# Patient Record
Sex: FEMALE | Race: WHITE | NOT HISPANIC OR LATINO | Employment: FULL TIME | URBAN - METROPOLITAN AREA
[De-identification: names, ages, dates, MRNs, and addresses within clinical notes are randomized per-mention and may not be internally consistent; named-entity substitution may affect disease eponyms.]

---

## 2021-05-17 ENCOUNTER — HISTORICAL (OUTPATIENT)
Dept: ADMINISTRATIVE | Facility: HOSPITAL | Age: 41
End: 2021-05-17

## 2021-06-14 LAB — FINAL CULTURE: NORMAL

## 2022-01-20 ENCOUNTER — HISTORICAL (OUTPATIENT)
Dept: ADMINISTRATIVE | Facility: HOSPITAL | Age: 42
End: 2022-01-20

## 2022-01-23 LAB — FINAL CULTURE: NO GROWTH

## 2022-09-01 ENCOUNTER — TELEPHONE (OUTPATIENT)
Dept: OTOLARYNGOLOGY | Facility: CLINIC | Age: 42
End: 2022-09-01
Payer: COMMERCIAL

## 2022-09-01 NOTE — TELEPHONE ENCOUNTER
Received referral, scheduled appt at the [facility] for 9/12 with Ramon. Then scanned referral into chart.

## 2022-09-13 DIAGNOSIS — E04.1 THYROID CYST: Primary | ICD-10-CM

## 2022-09-15 ENCOUNTER — TELEPHONE (OUTPATIENT)
Dept: OTOLARYNGOLOGY | Facility: CLINIC | Age: 42
End: 2022-09-15
Payer: COMMERCIAL

## 2022-09-15 NOTE — TELEPHONE ENCOUNTER
Dr. Medeiros called wanting to speak to Dr. Navarro about patient's case. Said he is an endocrinologist working with her and her thyroid, said she has a bit of an unusual cse. Told him Dr. Navarro would be back later this afternoon. He said Dr. Navarro could call him back any time after 2 PM at [phone].

## 2022-09-16 ENCOUNTER — HOSPITAL ENCOUNTER (OUTPATIENT)
Dept: RADIOLOGY | Facility: HOSPITAL | Age: 42
Discharge: HOME | End: 2022-09-16
Attending: INTERNAL MEDICINE
Payer: COMMERCIAL

## 2022-09-16 DIAGNOSIS — E04.1 THYROID CYST: ICD-10-CM

## 2022-09-16 PROCEDURE — 70492 CT SFT TSUE NCK W/O & W/DYE: CPT | Mod: TC

## 2022-09-19 ENCOUNTER — OFFICE VISIT (OUTPATIENT)
Dept: OTOLARYNGOLOGY | Facility: CLINIC | Age: 42
End: 2022-09-19
Payer: COMMERCIAL

## 2022-09-19 ENCOUNTER — LAB VISIT (OUTPATIENT)
Dept: LAB | Facility: HOSPITAL | Age: 42
End: 2022-09-19
Attending: OTOLARYNGOLOGY
Payer: COMMERCIAL

## 2022-09-19 VITALS — DIASTOLIC BLOOD PRESSURE: 77 MMHG | HEART RATE: 74 BPM | WEIGHT: 130 LBS | SYSTOLIC BLOOD PRESSURE: 119 MMHG

## 2022-09-19 DIAGNOSIS — Z01.818 PREOP TESTING: ICD-10-CM

## 2022-09-19 DIAGNOSIS — C73 PAPILLARY THYROID CARCINOMA: ICD-10-CM

## 2022-09-19 DIAGNOSIS — Z01.818 PREOP TESTING: Primary | ICD-10-CM

## 2022-09-19 LAB
ANION GAP SERPL CALC-SCNC: 12 MMOL/L (ref 8–16)
BASOPHILS # BLD AUTO: 0.02 K/UL (ref 0–0.2)
BASOPHILS NFR BLD: 0.3 % (ref 0–1.9)
BUN SERPL-MCNC: 10 MG/DL (ref 6–20)
CALCIUM SERPL-MCNC: 9.8 MG/DL (ref 8.7–10.5)
CALCIUM SERPL-MCNC: 9.8 MG/DL (ref 8.7–10.5)
CHLORIDE SERPL-SCNC: 102 MMOL/L (ref 95–110)
CO2 SERPL-SCNC: 21 MMOL/L (ref 23–29)
CREAT SERPL-MCNC: 0.8 MG/DL (ref 0.5–1.4)
DIFFERENTIAL METHOD: ABNORMAL
EOSINOPHIL # BLD AUTO: 0.1 K/UL (ref 0–0.5)
EOSINOPHIL NFR BLD: 1.1 % (ref 0–8)
ERYTHROCYTE [DISTWIDTH] IN BLOOD BY AUTOMATED COUNT: 12.3 % (ref 11.5–14.5)
EST. GFR  (NO RACE VARIABLE): >60 ML/MIN/1.73 M^2
GLUCOSE SERPL-MCNC: 84 MG/DL (ref 70–110)
HCT VFR BLD AUTO: 40 % (ref 37–48.5)
HGB BLD-MCNC: 13.2 G/DL (ref 12–16)
IMM GRANULOCYTES # BLD AUTO: 0.01 K/UL (ref 0–0.04)
IMM GRANULOCYTES NFR BLD AUTO: 0.2 % (ref 0–0.5)
LYMPHOCYTES # BLD AUTO: 1.7 K/UL (ref 1–4.8)
LYMPHOCYTES NFR BLD: 26.8 % (ref 18–48)
MCH RBC QN AUTO: 32 PG (ref 27–31)
MCHC RBC AUTO-ENTMCNC: 33 G/DL (ref 32–36)
MCV RBC AUTO: 97 FL (ref 82–98)
MONOCYTES # BLD AUTO: 0.4 K/UL (ref 0.3–1)
MONOCYTES NFR BLD: 5.9 % (ref 4–15)
NEUTROPHILS # BLD AUTO: 4.1 K/UL (ref 1.8–7.7)
NEUTROPHILS NFR BLD: 65.7 % (ref 38–73)
NRBC BLD-RTO: 0 /100 WBC
PLATELET # BLD AUTO: 205 K/UL (ref 150–450)
PMV BLD AUTO: 11.8 FL (ref 9.2–12.9)
POTASSIUM SERPL-SCNC: 3.9 MMOL/L (ref 3.5–5.1)
PTH-INTACT SERPL-MCNC: 57.1 PG/ML (ref 9–77)
RBC # BLD AUTO: 4.13 M/UL (ref 4–5.4)
SODIUM SERPL-SCNC: 135 MMOL/L (ref 136–145)
WBC # BLD AUTO: 6.24 K/UL (ref 3.9–12.7)

## 2022-09-19 PROCEDURE — 3078F PR MOST RECENT DIASTOLIC BLOOD PRESSURE < 80 MM HG: ICD-10-PCS | Mod: CPTII,S$GLB,, | Performed by: OTOLARYNGOLOGY

## 2022-09-19 PROCEDURE — 1159F PR MEDICATION LIST DOCUMENTED IN MEDICAL RECORD: ICD-10-PCS | Mod: CPTII,S$GLB,, | Performed by: OTOLARYNGOLOGY

## 2022-09-19 PROCEDURE — 1159F MED LIST DOCD IN RCRD: CPT | Mod: CPTII,S$GLB,, | Performed by: OTOLARYNGOLOGY

## 2022-09-19 PROCEDURE — 85025 COMPLETE CBC W/AUTO DIFF WBC: CPT | Performed by: OTOLARYNGOLOGY

## 2022-09-19 PROCEDURE — 80048 BASIC METABOLIC PNL TOTAL CA: CPT | Performed by: OTOLARYNGOLOGY

## 2022-09-19 PROCEDURE — 36415 COLL VENOUS BLD VENIPUNCTURE: CPT | Performed by: OTOLARYNGOLOGY

## 2022-09-19 PROCEDURE — 3078F DIAST BP <80 MM HG: CPT | Mod: CPTII,S$GLB,, | Performed by: OTOLARYNGOLOGY

## 2022-09-19 PROCEDURE — 99999 PR PBB SHADOW E&M-EST. PATIENT-LVL V: CPT | Mod: PBBFAC,,, | Performed by: OTOLARYNGOLOGY

## 2022-09-19 PROCEDURE — 99999 PR PBB SHADOW E&M-EST. PATIENT-LVL V: ICD-10-PCS | Mod: PBBFAC,,, | Performed by: OTOLARYNGOLOGY

## 2022-09-19 PROCEDURE — 99204 OFFICE O/P NEW MOD 45 MIN: CPT | Mod: S$GLB,,, | Performed by: OTOLARYNGOLOGY

## 2022-09-19 PROCEDURE — 3074F PR MOST RECENT SYSTOLIC BLOOD PRESSURE < 130 MM HG: ICD-10-PCS | Mod: CPTII,S$GLB,, | Performed by: OTOLARYNGOLOGY

## 2022-09-19 PROCEDURE — 83970 ASSAY OF PARATHORMONE: CPT | Performed by: OTOLARYNGOLOGY

## 2022-09-19 PROCEDURE — 3074F SYST BP LT 130 MM HG: CPT | Mod: CPTII,S$GLB,, | Performed by: OTOLARYNGOLOGY

## 2022-09-19 PROCEDURE — 99204 PR OFFICE/OUTPT VISIT, NEW, LEVL IV, 45-59 MIN: ICD-10-PCS | Mod: S$GLB,,, | Performed by: OTOLARYNGOLOGY

## 2022-09-19 RX ORDER — CLINDAMYCIN PHOSPHATE 11.9 MG/ML
SOLUTION TOPICAL
COMMUNITY

## 2022-09-19 RX ORDER — BENZOYL PEROXIDE 100 MG/ML
LIQUID TOPICAL
COMMUNITY

## 2022-09-19 RX ORDER — THYROID, PORCINE 15 MG/1
15 TABLET ORAL DAILY
Status: ON HOLD | COMMUNITY
Start: 2022-09-07 | End: 2022-10-05 | Stop reason: HOSPADM

## 2022-09-19 RX ORDER — THYROID 30 MG/1
TABLET ORAL
Status: ON HOLD | COMMUNITY
End: 2022-10-05 | Stop reason: HOSPADM

## 2022-09-19 NOTE — H&P (VIEW-ONLY)
Referring Provider:    Paco Medeiros Md  [address].  [address]  [place] [address]  Subjective:   Patient: Barbi Munoz [id number], :1980   Visit date:2022 9:24 AM    Chief Complaint:  Follow-up    HPI:  Barbi is a 42 y.o. female who I was asked to see in consultation for evaluation of the following issue(s):    I called and discussed her case with Dr. Medeiros (endocrinology).  Briefly, she had a left sided thyroid nodule and an abnormal left lateral neck lymph node on ultrasound. She had FNA performed of the thyroid nodule which returned as nondiagnostic.  This was repeated and returned as [place] 3 then was send for Afirma which returned as low risk on genetic profiling.  She also had FNA performed on the lymph node.  This was read as significantly irregular but not fully diagnostic of papillary thyroid cancer.  Tg washout was then performed on the specimen and this returned as >8000, highly consistent with tissue of thyroid origin in a lateral lymph node.  She has since had a CT of the neck performed.         COMPRESSIVE SYMPTOMS OR MINOR RISK FACTORS FOR THYROID CANCER (Negative unless checked):  []  Increasing in size over the past 6 months  [x]  Dysphagia   []  Dyspnea on exertion  []  Orthopnea  []  Hemoptysis  []  Voice changes  [x]  Pain- left sided lower neck  []  AGE >45    [x]  FEMALE     HIGH RISK HISTORY FOR THYROID CANCER:  []  Thyroid cancer in 1 or more first degree relatives  []  History of radiation exposure to the neck  []  Prior thyroidectomy with dx of thyroid cancer  []  PET positive nodule  []  Multiple Endocrine Neoplasia  []  Familial Medullary Thyroid Cancer    (2009 REVISED [employer] MANAGEMENT GUIDELINES FOR PATIENTS WITH THYROID NODULES AND DIFFERENTIATED THYROID CANCER)            Objective:   Physical Exam:  Vitals:  /77   Pulse 74   Wt 59 kg (130 lb)   LMP 2022 (Exact Date)   General appearance:  Well developed,  well nourished        Communication:  no hoarseness, no dysphonia    Ears:  Otoscopy of external auditory canals and tympanic membranes was normal, clinical speech reception thresholds grossly intact, no mass/lesion of auricle.  Nose:  No masses/lesions of external nose, nasal mucosa, septum, and turbinates were within normal limits.  Mouth:  No mass/lesion of lips, teeth, gums, hard/soft palate, tongue, tonsils, or oropharynx.      Neck & Lymphatics:  palpable level 4 cervical lymphadenopathy tender to palpation on the left, no neck mass/crepitus/ asymmetry, trachea is midline.  THYROID: palpable nodule on the left    DATA REVIEWED    [x] On thyroid hormone medications (check if YES)  On thyroid armour      ULTRASOUND:  No results found for this or any previous visit.    No results found for this or any previous visit.        PATHOLOGY:  See H&P    Independent interpretation of thyroid ultrasound images:  CT neck with contrast- left sided thyroid nodule with numerous calcifications; left sided level 4 lymph node with peripheral calcification; moderately enlarged additional lymph nodes in left level 4-6      Assessment & Plan:   Barbi was seen today for follow-up.    Diagnoses and all orders for this visit:    Preop testing  -     CBC W/ AUTO DIFFERENTIAL; Future  -     BASIC METABOLIC PANEL; Future    Papillary thyroid carcinoma  -     Case Request Operating Room: DISSECTION, NECK  -     PTH, Intact; Future  -     Calcium; Future        Despite the low risk Afirma testing, Tg in a lateral lymph node is extremely consistent with the diagnosis of papillary thyroid cancer with regional lymph node metastases.      My recommendation is total thyroidectomy with central neck dissection (level 6) and ipsilateral (left) modified radical neck dissection levels 2-5.    I explained the risks of thyroidectomy include, but are not limited to, infection, bleeding, scarring, failure to achieve the diagnosis, no evidence of  cancer, recurrence, collection of blood or tissue fluid requiring drainage, injury to the recurrent laryngeal nerve with resultant temporary or permanent hoarseness (1% permanent risk with up to 10% temporary risk, greater in revision operations), injury to the superior laryngeal nerve with resultant loss of the upper register for singing or challenges with yelling, temporary or permanent hypocalcemia related to injury or devascularization of the parathyroid glands (less than 5% permanent, up to 30-60% when paratracheal dissection is accomplished, again greater in revision operations), and the need for additional procedures or therapies. Time was allowed for questions, and all questions were answered to the patient's apparent satisfaction. The risks of paratracheal lymph node dissection are included above.  We discussed that her likelihood of transient or permanent hypoparathyroidism/hypocalcemia is significantly elevated when paratracheal lymph node dissection is performed.    The risks, benefits and alternatives were discussed at length with Barbi and they had the opportunity to ask several questions to their satisfaction.  The risks included but, were not limited to, scarring, bleeding, infection, fistula formation, chyle leak, weakness of the lip/shoulder/tongue and cosmetic deformity.      She understands that there is a significant likelihood of needing postoperative radioactive iodine therapy postoperatively.       Barbi understands the situation and after a lengthy discussion would like to proceed.  Surgery is scheduled for October 3, 2022.     Thank you for allowing me to participate in the care of Barbi.    Francisco Navarro MD    Total time spent in the care of this patient    New  3  [] 30-44 min  4  [] 45-59 min  5  [x] 60-74 min    Established  3  [] 20-29 min  4  [] 30-39 min  5  [] 40-54 min      [x] preparing to see the patient (eg, review of tests)  [x] obtaining and/or reviewing separately  obtained history  [x] performing a medically appropriate examination and/or evaluation  [x] counseling and educating the patient/family/caregiver  [x] ordering medications, tests, or procedures  [] referring and communicating with other health care professionals (when not separately reported)  [x] documenting clinical information in the electronic or other health record  [x] independently interpreting results (not separately reported) and communicating results to the  patient/ family/caregiver  [] care coordination (not separately reported)

## 2022-09-19 NOTE — PROGRESS NOTES
Referring Provider:    Paco Medeiros Md  [address].  [address]  [place] [address]  Subjective:   Patient: Barbi Munoz [id number], :1980   Visit date:2022 9:24 AM    Chief Complaint:  Follow-up    HPI:  Barbi is a 42 y.o. female who I was asked to see in consultation for evaluation of the following issue(s):    I called and discussed her case with Dr. Medeiros (endocrinology).  Briefly, she had a left sided thyroid nodule and an abnormal left lateral neck lymph node on ultrasound. She had FNA performed of the thyroid nodule which returned as nondiagnostic.  This was repeated and returned as [place] 3 then was send for Afirma which returned as low risk on genetic profiling.  She also had FNA performed on the lymph node.  This was read as significantly irregular but not fully diagnostic of papillary thyroid cancer.  Tg washout was then performed on the specimen and this returned as >8000, highly consistent with tissue of thyroid origin in a lateral lymph node.  She has since had a CT of the neck performed.         COMPRESSIVE SYMPTOMS OR MINOR RISK FACTORS FOR THYROID CANCER (Negative unless checked):  []  Increasing in size over the past 6 months  [x]  Dysphagia   []  Dyspnea on exertion  []  Orthopnea  []  Hemoptysis  []  Voice changes  [x]  Pain- left sided lower neck  []  AGE >45    [x]  FEMALE     HIGH RISK HISTORY FOR THYROID CANCER:  []  Thyroid cancer in 1 or more first degree relatives  []  History of radiation exposure to the neck  []  Prior thyroidectomy with dx of thyroid cancer  []  PET positive nodule  []  Multiple Endocrine Neoplasia  []  Familial Medullary Thyroid Cancer    (2009 REVISED [employer]IATION MANAGEMENT GUIDELINES FOR PATIENTS WITH THYROID NODULES AND DIFFERENTIATED THYROID CANCER)            Objective:   Physical Exam:  Vitals:  /77   Pulse 74   Wt 59 kg (130 lb)   LMP 2022 (Exact Date)   General appearance:  Well developed,  well nourished        Communication:  no hoarseness, no dysphonia    Ears:  Otoscopy of external auditory canals and tympanic membranes was normal, clinical speech reception thresholds grossly intact, no mass/lesion of auricle.  Nose:  No masses/lesions of external nose, nasal mucosa, septum, and turbinates were within normal limits.  Mouth:  No mass/lesion of lips, teeth, gums, hard/soft palate, tongue, tonsils, or oropharynx.      Neck & Lymphatics:  palpable level 4 cervical lymphadenopathy tender to palpation on the left, no neck mass/crepitus/ asymmetry, trachea is midline.  THYROID: palpable nodule on the left    DATA REVIEWED    [x] On thyroid hormone medications (check if YES)  On thyroid armour      ULTRASOUND:  No results found for this or any previous visit.    No results found for this or any previous visit.        PATHOLOGY:  See H&P    Independent interpretation of thyroid ultrasound images:  CT neck with contrast- left sided thyroid nodule with numerous calcifications; left sided level 4 lymph node with peripheral calcification; moderately enlarged additional lymph nodes in left level 4-6      Assessment & Plan:   Barbi was seen today for follow-up.    Diagnoses and all orders for this visit:    Preop testing  -     CBC W/ AUTO DIFFERENTIAL; Future  -     BASIC METABOLIC PANEL; Future    Papillary thyroid carcinoma  -     Case Request Operating Room: DISSECTION, NECK  -     PTH, Intact; Future  -     Calcium; Future        Despite the low risk Afirma testing, Tg in a lateral lymph node is extremely consistent with the diagnosis of papillary thyroid cancer with regional lymph node metastases.      My recommendation is total thyroidectomy with central neck dissection (level 6) and ipsilateral (left) modified radical neck dissection levels 2-5.    I explained the risks of thyroidectomy include, but are not limited to, infection, bleeding, scarring, failure to achieve the diagnosis, no evidence of  cancer, recurrence, collection of blood or tissue fluid requiring drainage, injury to the recurrent laryngeal nerve with resultant temporary or permanent hoarseness (1% permanent risk with up to 10% temporary risk, greater in revision operations), injury to the superior laryngeal nerve with resultant loss of the upper register for singing or challenges with yelling, temporary or permanent hypocalcemia related to injury or devascularization of the parathyroid glands (less than 5% permanent, up to 30-60% when paratracheal dissection is accomplished, again greater in revision operations), and the need for additional procedures or therapies. Time was allowed for questions, and all questions were answered to the patient's apparent satisfaction. The risks of paratracheal lymph node dissection are included above.  We discussed that her likelihood of transient or permanent hypoparathyroidism/hypocalcemia is significantly elevated when paratracheal lymph node dissection is performed.    The risks, benefits and alternatives were discussed at length with Barbi and they had the opportunity to ask several questions to their satisfaction.  The risks included but, were not limited to, scarring, bleeding, infection, fistula formation, chyle leak, weakness of the lip/shoulder/tongue and cosmetic deformity.      She understands that there is a significant likelihood of needing postoperative radioactive iodine therapy postoperatively.       Barbi understands the situation and after a lengthy discussion would like to proceed.  Surgery is scheduled for October 3, 2022.     Thank you for allowing me to participate in the care of Babri.    Francisco Navarro MD    Total time spent in the care of this patient    New  3  [] 30-44 min  4  [] 45-59 min  5  [x] 60-74 min    Established  3  [] 20-29 min  4  [] 30-39 min  5  [] 40-54 min      [x] preparing to see the patient (eg, review of tests)  [x] obtaining and/or reviewing separately  obtained history  [x] performing a medically appropriate examination and/or evaluation  [x] counseling and educating the patient/family/caregiver  [x] ordering medications, tests, or procedures  [] referring and communicating with other health care professionals (when not separately reported)  [x] documenting clinical information in the electronic or other health record  [x] independently interpreting results (not separately reported) and communicating results to the  patient/ family/caregiver  [] care coordination (not separately reported)

## 2022-09-19 NOTE — PATIENT INSTRUCTIONS
Keep up to date on our practice locations and hours!    Let us know how we are doing!              [email or web address]

## 2022-09-28 ENCOUNTER — HOSPITAL ENCOUNTER (OUTPATIENT)
Dept: PREADMISSION TESTING | Facility: HOSPITAL | Age: 42
Discharge: HOME | End: 2022-09-28
Attending: OTOLARYNGOLOGY | Admitting: OTOLARYNGOLOGY
Payer: COMMERCIAL

## 2022-09-28 ENCOUNTER — PATIENT MESSAGE (OUTPATIENT)
Dept: SURGERY | Facility: HOSPITAL | Age: 42
End: 2022-09-28
Payer: COMMERCIAL

## 2022-09-28 VITALS
TEMPERATURE: 98 F | RESPIRATION RATE: 18 BRPM | DIASTOLIC BLOOD PRESSURE: 76 MMHG | SYSTOLIC BLOOD PRESSURE: 135 MMHG | HEART RATE: 77 BPM | OXYGEN SATURATION: 100 %

## 2022-09-28 DIAGNOSIS — C73 PAPILLARY THYROID CARCINOMA: ICD-10-CM

## 2022-09-28 NOTE — DISCHARGE INSTRUCTIONS
To confirm, your doctor has instructed you that surgery is scheduled for 10/3/2022.       Pre admit office will call the afternoon prior to surgery between 1PM and 3PM with arrival time.    Surgery will be at [facility],  The address is [address]. [place]  [address].      IMPORTANT INSTRUCTIONS!    Do not eat or drink after 12 midnight, including water.   Do not smoke or use chewing tobacco after 12 midnight  OK to brush teeth, but no gum, candy, or mints!      Take only these medicines with a small swallow of water-morning of surgery.     [place] Thyroid      ____ Stop Aspirin, Ibuprofen, Motrin and Aleve at least 5-7 days before surgery, unless otherwise instructed by your doctor, or the nurse.   You MAY use Tylenol/acetaminophen until day of surgery.      ____  If you take diabetic medication, do NOT take morning of surgery unless instructed by Doctor. Metformin must be stopped 24 hrs prior to surgery time.       ____ Stop taking any Fish Oil supplements or Vitamins at least 5 days prior to surgery, unless instructed otherwise by your Doctor.       Bathing Instructions: The night before surgery and the morning prior to coming to the hospital:    - Shower & rinse your body as usual with anti-bacterial Soap (Dial or Lever 2000)   -Hibiclens (if indicated) use AFTER anti-bacterial soap; 1 packet PM/1 packet in AM on surgical site only   -Do not use hibiclens on your head, face, or genitals.    -Do not wash with anti-bacterial soap after you use the hibiclens.    -Do not shave surgical site 5-7 days prior to surgery.    -Pubic hair 7 days prior to surgery (gyn pt's).      Pediatric patients do not need to use anti-bacterial soap or Hibiclens.             After Bathing:   __ No powder, lotions, creams, or body spray to skin     __No deodorant for any breast procedure, PORT, or upper arm surgery     __ No makeup, mascara, nail polish or artificial nails        **SURGERY WILL BE CANCELLED IF  ARTIFICIAL/NAIL POLISH IS PRESENT!!!**    __ Please remove all piercings and leave all jewelry at home.    **SURGERY WILL BE CANCELLED IF PIERCINGS ARE PRESENT!!!**      __ Dentures, Hearing Aids and Contact Lens need to be removed prior to the start of surgery.      __ Wear clean, loose-fitting clothing. Allow for dressings/bandages/surgical equipment     __ You must have transportation, and they MUST stay the entire time.       [facility] Visitor/Ride Policy:   Only 1 adult allowed (over the age of 18) to accompany you into Pre-op/Recovery Surgery Dept and must stay through the entire length of admission.     Must have a ride home from a responsible adult that you know and trust.      Pediatric Patients are allowed 2 adult visitors.     Medical Transport, Uber or Lyft can only be used if patient has a responsible adult to accompany them during ride home.         Post-Op Instructions: You will receive surgery post-op instructions by your Discharge Nurse prior to going home.     Surgical Site Infection:   Prevention of surgical site infections:   -Keep incisions clean and dry.   -Do not soak/submerge incisions in water until completely healed.   -Do not apply lotions, powders, creams, or deodorants to site.   -Always make sure hands are cleaned with antibacterial soap/ alcohol-based   prior to touching the surgical site.       Signs and symptoms:               -Redness and pain around the area where you had surgery               -Drainage of cloudy fluid from your surgical wound               -Fever over 100.4 or chills     >>>Call Surgeon office/on-call Surgeon if you experience any of these signs & symptoms post-surgery.        *Please Call [facility] Pre-Admissions Department with surgery instruction questions at [phone].     *Insurance Questions, please call [phone] or [phone]

## 2022-09-28 NOTE — H&P
Preoperative History and Physical  [facility]                                                                   Chief Complaint: Preoperative evaluation     History of Present Illness:      Barbi Munoz is a 42 y.o. female with a PMHx of Hashimoto's disease, Interstitial Cystitis, Celiac Disease, and suspected Papillary thyroid carcinoma who presents to the office today for a preoperative consultation at the request of Dr. Navarro who plans on performing Left neck dissection and total thyroidectomy on October 3.     Functional Status:      The patient is able to climb a flight of stairs. The patient is able to ambulate without difficulty. The patient's functional status is not affected by the surgical problem. The patient's functional status is not affected by shortness of breath, chest pain, dyspnea on exertion and fatigue.      MET score greater than 4    Past Medical History:      Past Medical History:   Diagnosis Date    Cancer     Papillary thyroid carcinoma    Celiac disease     Hashimoto's disease     Interstitial cystitis         Past Surgical History:      Past Surgical History:   Procedure Laterality Date    ADENOIDECTOMY       SECTION      x 3    CYSTOSCOPY      TONSILLECTOMY      .    Social History:      Social History     Socioeconomic History    Marital status:    Tobacco Use    Smoking status: Former     Types: Cigarettes     Quit date:      Years since quittin.7     Passive exposure: Never    Smokeless tobacco: Never   Substance and Sexual Activity    Alcohol use: Never    Drug use: Never        Family History:      Family History   Problem Relation Age of Onset    Hypertension Mother     Hypertension Father     Hashimoto's thyroiditis Sister     Arthritis Sister     Autism Brother     Epilepsy Brother     No Known Problems Maternal Grandfather     No Known Problems Maternal Grandmother     No Known Problems Paternal  Grandfather     No Known Problems Paternal Grandmother     Ovarian cancer Paternal Aunt        Allergies:      Review of patient's allergies indicates:   Allergen Reactions    Gluten     Latex Rash    Sulfa (sulfonamide antibiotics) Rash       Medications:      Current Outpatient Medications   Medication Sig    ARMOUR THYROID 15 mg Tab Take 15 mg by mouth once daily.    benzoyl peroxide (BENZAC AC) 10 % external wash Acne Control Cleanser 10 % topical cleanser   APPLY TO SKIN FOR 5 MINUTES THEN RINSE DAILY    clindamycin (CLEOCIN T) 1 % external solution clindamycin phosphate 1 % topical solution    thyroid, pork, (ARMOUR THYROID) 30 mg Tab [place] Thyroid 30 mg tablet   TAKE 1 TABLET BY MOUTH EVERY DAY WITH 15MG     No current facility-administered medications for this encounter.       Vitals:      Vitals:    09/28/22 1055   BP: 135/76   Pulse: 77   Resp: 18   Temp: 98.4 °F (36.9 °C)       Review of Systems:        Constitutional: Negative for fever, chills, weight loss, malaise/fatigue and diaphoresis.   HENT: Negative for hearing loss, ear pain, nosebleeds, congestion, sore throat, neck pain, tinnitus and ear discharge.  Positive for Left sided neck pain, left sided ear pain, and discomfort during swallowing, but no dysphagia.  Eyes: Negative for blurred vision, double vision, photophobia, pain, discharge and redness.   Respiratory: Negative for cough, hemoptysis, sputum production, shortness of breath, wheezing and stridor.    Cardiovascular: Negative for chest pain, palpitations, orthopnea, claudication, leg swelling and PND.   Gastrointestinal: Negative for heartburn, nausea, vomiting, abdominal pain, diarrhea, constipation, blood in stool and melena.   Genitourinary: Negative for dysuria, urgency, frequency, hematuria and flank pain.   Musculoskeletal: Negative for myalgias, back pain, joint pain and falls.   Skin: Negative for itching and rash.   Neurological: Negative for dizziness, tingling, tremors,  sensory change, speech change, focal weakness, seizures, loss of consciousness, weakness and headaches.   Endo/Heme/Allergies: Negative for environmental allergies and polydipsia. Does not bruise/bleed easily.   Psychiatric/Behavioral: Negative for depression, suicidal ideas, hallucinations, memory loss and substance abuse. The patient is not nervous/anxious and does not have insomnia.    All 14 systems reviewed and negative except as noted above.    Physical Exam:      Constitutional: Appears well-developed, well-nourished and in no acute distress.  Patient is oriented to person, place, and time.   Head: Normocephalic and atraumatic. Mucous membranes moist.  Neck: Neck supple no mass.   Cardiovascular: Normal rate and regular rhythm.  S1 S2 appreciated by ascultation.  Pulmonary/Chest: Effort normal and clear to auscultation bilaterally. No respiratory distress.   Abdomen: Soft. Non-tender and non-distended. Bowel sounds are normal.   Neurological: Patient is alert and oriented to person, place and time. Moves all extremities.  Skin: Warm and dry. No lesions.  Extremities: No clubbing, cyanosis or edema.    Laboratory data:      Reviewed and noted in plan where applicable. Please see chart for full laboratory data.    No results for input(s): CPK, CPKMB, TROPONINI, MB in the last 24 hours. No results for input(s): POCTGLUCOSE in the last 24 hours.     No results found for: INR, PROTIME    Lab Results   Component Value Date    WBC 6.24 09/19/2022    HGB 13.2 09/19/2022    HCT 40.0 09/19/2022    MCV 97 09/19/2022     09/19/2022       No results for input(s): GLU, NA, K, CL, CO2, BUN, CREATININE, CALCIUM, MG in the last 24 hours.    Predictors of intubation difficulty:       Morbid obesity? no   Anatomically abnormal facies? no   Prominent incisors? no   Receding mandible? no   Short, thick neck? no   Neck range of motion: normal   Dentition: No chipped, loose, or missing teeth.  Based on the Modified  Mallampati, patient is a mallampati score: I (soft palate, uvula, fauces, and tonsillar pillars visible)    Cardiographics:      ECG:  Not indicated.    Echocardiogram:  Not indicated.    Imaging:      Chest x-ray:  Not indicated.      Assessment and Plan:      Papillary thyroid carcinoma  - Patient presented today at the request of Dr. Navarro who plans performing a central neck dissection (level 6) and ipsilateral (left) modified radical neck dissection levels 2-5, and total thyroidectomy on 10/3.      Known risk factors for perioperative complications: None    Difficulty with intubation is not anticipated.    Cardiac Risk Estimation: Based on the Revised Cardiac Risk index, patient is a Class 1 risk with a 3.9% risk of a major cardiac event in a low risk procedure.    1.) Preoperative workup as follows: hemoglobin, hematocrit, electrolytes, creatinine, glucose, liver function studies.  2.) Change in medication regimen before surgery: discontinue ASA 6 days before surgery, discontinue NSAIDs 5 days before surgery.  3.) Prophylaxis for cardiac events with perioperative beta-blockers: not indicated.  4.) Invasive hemodynamic monitoring perioperatively: not indicated.  5.) Deep vein thrombosis prophylaxis postoperatively: intermittent pneumatic compression boots and regimen to be chosen by surgical team.  6.) Surveillance for postoperative MI with ECG immediately postoperatively and on postoperati ve days 1 and 2 AND troponin levels 24 hours postoperatively and on day 4 or hospital discharge (whichever comes first): not indicated.  7.) Current medications which may produce withdrawal symptoms if withheld perioperatively: None.  8.) Other measures: None.        Electronically signed by Camila Freire DNP, ACNP on 9/28/2022 at 11:06 AM.

## 2022-09-28 NOTE — ASSESSMENT & PLAN NOTE
- Patient presented today at the request of Dr. Navarro who plans performing a central neck dissection (level 6) and ipsilateral (left) modified radical neck dissection levels 2-5, and total thyroidectomy on 10/3.      Known risk factors for perioperative complications: None    Difficulty with intubation is not anticipated.    Cardiac Risk Estimation: Based on the Revised Cardiac Risk index, patient is a Class 1 risk with a 3.9% risk of a major cardiac event in a low risk procedure.    1.) Preoperative workup as follows: hemoglobin, hematocrit, electrolytes, creatinine, glucose, liver function studies.  2.) Change in medication regimen before surgery: discontinue ASA 6 days before surgery, discontinue NSAIDs 5 days before surgery.  3.) Prophylaxis for cardiac events with perioperative beta-blockers: not indicated.  4.) Invasive hemodynamic monitoring perioperatively: not indicated.  5.) Deep vein thrombosis prophylaxis postoperatively: intermittent pneumatic compression boots and regimen to be chosen by surgical team.  6.) Surveillance for postoperative MI with ECG immediately postoperatively and on postoperati ve days 1 and 2 AND troponin levels 24 hours postoperatively and on day 4 or hospital discharge (whichever comes first): not indicated.  7.) Current medications which may produce withdrawal symptoms if withheld perioperatively: None.  8.) Other measures: None.

## 2022-09-29 ENCOUNTER — ANESTHESIA EVENT (OUTPATIENT)
Dept: SURGERY | Facility: HOSPITAL | Age: 42
DRG: 626 | End: 2022-09-29
Payer: COMMERCIAL

## 2022-10-03 ENCOUNTER — ANESTHESIA (OUTPATIENT)
Dept: SURGERY | Facility: HOSPITAL | Age: 42
DRG: 626 | End: 2022-10-03
Payer: COMMERCIAL

## 2022-10-03 ENCOUNTER — HOSPITAL ENCOUNTER (INPATIENT)
Facility: HOSPITAL | Age: 42
LOS: 2 days | Discharge: HOME | DRG: 626 | End: 2022-10-05
Attending: OTOLARYNGOLOGY | Admitting: OTOLARYNGOLOGY
Payer: COMMERCIAL

## 2022-10-03 DIAGNOSIS — C73 PAPILLARY THYROID CARCINOMA: Primary | ICD-10-CM

## 2022-10-03 LAB
B-HCG UR QL: NEGATIVE
CALCIUM SERPL-MCNC: 8.7 MG/DL (ref 8.7–10.5)
CTP QC/QA: YES
MAGNESIUM SERPL-MCNC: 2 MG/DL (ref 1.6–2.6)
PTH-INTACT SERPL-MCNC: 6.3 PG/ML (ref 9–77)

## 2022-10-03 PROCEDURE — 25000003 PHARM REV CODE 250: Performed by: NURSE ANESTHETIST, CERTIFIED REGISTERED

## 2022-10-03 PROCEDURE — D9220A PRA ANESTHESIA: Mod: CRNA,,, | Performed by: NURSE ANESTHETIST, CERTIFIED REGISTERED

## 2022-10-03 PROCEDURE — 63600175 PHARM REV CODE 636 W HCPCS: Performed by: OTOLARYNGOLOGY

## 2022-10-03 PROCEDURE — 38724 PR REMOVAL NODES, NECK,CERV MOD RAD: ICD-10-PCS | Mod: LT,,, | Performed by: OTOLARYNGOLOGY

## 2022-10-03 PROCEDURE — 83970 ASSAY OF PARATHORMONE: CPT | Performed by: OTOLARYNGOLOGY

## 2022-10-03 PROCEDURE — 60252 REMOVAL OF THYROID: CPT | Mod: 51,,, | Performed by: OTOLARYNGOLOGY

## 2022-10-03 PROCEDURE — 88305 TISSUE EXAM BY PATHOLOGIST: CPT | Performed by: STUDENT IN AN ORGANIZED HEALTH CARE EDUCATION/TRAINING PROGRAM

## 2022-10-03 PROCEDURE — 88305 TISSUE EXAM BY PATHOLOGIST: CPT | Mod: 26,,, | Performed by: STUDENT IN AN ORGANIZED HEALTH CARE EDUCATION/TRAINING PROGRAM

## 2022-10-03 PROCEDURE — 11000001 HC ACUTE MED/SURG PRIVATE ROOM

## 2022-10-03 PROCEDURE — 71000033 HC RECOVERY, INTIAL HOUR: Performed by: OTOLARYNGOLOGY

## 2022-10-03 PROCEDURE — D9220A PRA ANESTHESIA: Mod: ANES,,, | Performed by: ANESTHESIOLOGY

## 2022-10-03 PROCEDURE — 38724 REMOVAL OF LYMPH NODES NECK: CPT | Mod: LT,,, | Performed by: OTOLARYNGOLOGY

## 2022-10-03 PROCEDURE — 63600175 PHARM REV CODE 636 W HCPCS: Performed by: NURSE ANESTHETIST, CERTIFIED REGISTERED

## 2022-10-03 PROCEDURE — 36000707: Performed by: OTOLARYNGOLOGY

## 2022-10-03 PROCEDURE — 88305 TISSUE EXAM BY PATHOLOGIST: ICD-10-PCS | Mod: 26,,, | Performed by: STUDENT IN AN ORGANIZED HEALTH CARE EDUCATION/TRAINING PROGRAM

## 2022-10-03 PROCEDURE — 71000039 HC RECOVERY, EACH ADD'L HOUR: Performed by: OTOLARYNGOLOGY

## 2022-10-03 PROCEDURE — C1729 CATH, DRAINAGE: HCPCS | Performed by: OTOLARYNGOLOGY

## 2022-10-03 PROCEDURE — 88307 PR  SURG PATH,LEVEL V: ICD-10-PCS | Mod: 26,,, | Performed by: STUDENT IN AN ORGANIZED HEALTH CARE EDUCATION/TRAINING PROGRAM

## 2022-10-03 PROCEDURE — 27201423 OPTIME MED/SURG SUP & DEVICES STERILE SUPPLY: Performed by: OTOLARYNGOLOGY

## 2022-10-03 PROCEDURE — D9220A PRA ANESTHESIA: ICD-10-PCS | Mod: CRNA,,, | Performed by: NURSE ANESTHETIST, CERTIFIED REGISTERED

## 2022-10-03 PROCEDURE — 88307 TISSUE EXAM BY PATHOLOGIST: CPT | Performed by: STUDENT IN AN ORGANIZED HEALTH CARE EDUCATION/TRAINING PROGRAM

## 2022-10-03 PROCEDURE — 37000008 HC ANESTHESIA 1ST 15 MINUTES: Performed by: OTOLARYNGOLOGY

## 2022-10-03 PROCEDURE — 60252 PR THYROIDECTOMY,MALIG,LTD NECK SURG: ICD-10-PCS | Mod: 51,,, | Performed by: OTOLARYNGOLOGY

## 2022-10-03 PROCEDURE — 36000706: Performed by: OTOLARYNGOLOGY

## 2022-10-03 PROCEDURE — 37000009 HC ANESTHESIA EA ADD 15 MINS: Performed by: OTOLARYNGOLOGY

## 2022-10-03 PROCEDURE — 81025 URINE PREGNANCY TEST: CPT | Performed by: OTOLARYNGOLOGY

## 2022-10-03 PROCEDURE — 25000003 PHARM REV CODE 250: Performed by: OTOLARYNGOLOGY

## 2022-10-03 PROCEDURE — 82310 ASSAY OF CALCIUM: CPT | Performed by: OTOLARYNGOLOGY

## 2022-10-03 PROCEDURE — 88307 TISSUE EXAM BY PATHOLOGIST: CPT | Mod: 26,,, | Performed by: STUDENT IN AN ORGANIZED HEALTH CARE EDUCATION/TRAINING PROGRAM

## 2022-10-03 PROCEDURE — 83735 ASSAY OF MAGNESIUM: CPT | Performed by: OTOLARYNGOLOGY

## 2022-10-03 PROCEDURE — D9220A PRA ANESTHESIA: ICD-10-PCS | Mod: ANES,,, | Performed by: ANESTHESIOLOGY

## 2022-10-03 PROCEDURE — 63600175 PHARM REV CODE 636 W HCPCS: Performed by: ANESTHESIOLOGY

## 2022-10-03 RX ORDER — MIDAZOLAM HYDROCHLORIDE 1 MG/ML
INJECTION, SOLUTION INTRAMUSCULAR; INTRAVENOUS
Status: DISCONTINUED | OUTPATIENT
Start: 2022-10-03 | End: 2022-10-03

## 2022-10-03 RX ORDER — SUCCINYLCHOLINE CHLORIDE 20 MG/ML
INJECTION INTRAMUSCULAR; INTRAVENOUS
Status: DISCONTINUED | OUTPATIENT
Start: 2022-10-03 | End: 2022-10-03

## 2022-10-03 RX ORDER — SODIUM CHLORIDE, SODIUM LACTATE, POTASSIUM CHLORIDE, CALCIUM CHLORIDE 600; 310; 30; 20 MG/100ML; MG/100ML; MG/100ML; MG/100ML
INJECTION, SOLUTION INTRAVENOUS CONTINUOUS
Status: ACTIVE | OUTPATIENT
Start: 2022-10-03 | End: 2022-10-03

## 2022-10-03 RX ORDER — LEVOTHYROXINE SODIUM 25 UG/1
150 TABLET ORAL
Status: DISCONTINUED | OUTPATIENT
Start: 2022-10-04 | End: 2022-10-05 | Stop reason: HOSPADM

## 2022-10-03 RX ORDER — SODIUM CHLORIDE 0.9 % (FLUSH) 0.9 %
10 SYRINGE (ML) INJECTION
Status: DISCONTINUED | OUTPATIENT
Start: 2022-10-03 | End: 2022-10-03

## 2022-10-03 RX ORDER — ACETAMINOPHEN 325 MG/1
650 TABLET ORAL EVERY 4 HOURS PRN
Status: DISCONTINUED | OUTPATIENT
Start: 2022-10-03 | End: 2022-10-05 | Stop reason: HOSPADM

## 2022-10-03 RX ORDER — ONDANSETRON 2 MG/ML
8 INJECTION INTRAMUSCULAR; INTRAVENOUS EVERY 8 HOURS PRN
Status: DISCONTINUED | OUTPATIENT
Start: 2022-10-03 | End: 2022-10-05 | Stop reason: HOSPADM

## 2022-10-03 RX ORDER — PROPOFOL 10 MG/ML
VIAL (ML) INTRAVENOUS CONTINUOUS PRN
Status: DISCONTINUED | OUTPATIENT
Start: 2022-10-03 | End: 2022-10-03

## 2022-10-03 RX ORDER — DEXAMETHASONE SODIUM PHOSPHATE 4 MG/ML
INJECTION, SOLUTION INTRA-ARTICULAR; INTRALESIONAL; INTRAMUSCULAR; INTRAVENOUS; SOFT TISSUE
Status: DISCONTINUED | OUTPATIENT
Start: 2022-10-03 | End: 2022-10-03

## 2022-10-03 RX ORDER — HYDROMORPHONE HYDROCHLORIDE 2 MG/ML
0.2 INJECTION, SOLUTION INTRAMUSCULAR; INTRAVENOUS; SUBCUTANEOUS EVERY 5 MIN PRN
Status: DISCONTINUED | OUTPATIENT
Start: 2022-10-03 | End: 2022-10-03

## 2022-10-03 RX ORDER — LANOLIN ALCOHOL/MO/W.PET/CERES
400 CREAM (GRAM) TOPICAL 2 TIMES DAILY
Status: DISCONTINUED | OUTPATIENT
Start: 2022-10-03 | End: 2022-10-05 | Stop reason: HOSPADM

## 2022-10-03 RX ORDER — LIDOCAINE HCL/PF 100 MG/5ML
SYRINGE (ML) INTRAVENOUS
Status: DISCONTINUED | OUTPATIENT
Start: 2022-10-03 | End: 2022-10-03

## 2022-10-03 RX ORDER — FENTANYL CITRATE 50 UG/ML
INJECTION, SOLUTION INTRAMUSCULAR; INTRAVENOUS
Status: DISCONTINUED | OUTPATIENT
Start: 2022-10-03 | End: 2022-10-03

## 2022-10-03 RX ORDER — BUPIVACAINE HYDROCHLORIDE AND EPINEPHRINE 5; 5 MG/ML; UG/ML
INJECTION, SOLUTION EPIDURAL; INTRACAUDAL; PERINEURAL
Status: DISPENSED
Start: 2022-10-03 | End: 2022-10-03

## 2022-10-03 RX ORDER — ONDANSETRON 2 MG/ML
INJECTION INTRAMUSCULAR; INTRAVENOUS
Status: DISCONTINUED | OUTPATIENT
Start: 2022-10-03 | End: 2022-10-03

## 2022-10-03 RX ORDER — MUPIROCIN 20 MG/G
OINTMENT TOPICAL
Status: DISCONTINUED
Start: 2022-10-03 | End: 2022-10-03 | Stop reason: WASHOUT

## 2022-10-03 RX ORDER — BUPIVACAINE HYDROCHLORIDE AND EPINEPHRINE 5; 5 MG/ML; UG/ML
INJECTION, SOLUTION EPIDURAL; INTRACAUDAL; PERINEURAL
Status: DISCONTINUED | OUTPATIENT
Start: 2022-10-03 | End: 2022-10-03 | Stop reason: HOSPADM

## 2022-10-03 RX ORDER — SODIUM CHLORIDE, SODIUM LACTATE, POTASSIUM CHLORIDE, CALCIUM CHLORIDE 600; 310; 30; 20 MG/100ML; MG/100ML; MG/100ML; MG/100ML
INJECTION, SOLUTION INTRAVENOUS CONTINUOUS
Status: DISCONTINUED | OUTPATIENT
Start: 2022-10-03 | End: 2022-10-03

## 2022-10-03 RX ORDER — ROCURONIUM BROMIDE 10 MG/ML
INJECTION, SOLUTION INTRAVENOUS
Status: DISCONTINUED | OUTPATIENT
Start: 2022-10-03 | End: 2022-10-03

## 2022-10-03 RX ORDER — ACETAMINOPHEN 10 MG/ML
INJECTION, SOLUTION INTRAVENOUS
Status: DISCONTINUED | OUTPATIENT
Start: 2022-10-03 | End: 2022-10-03

## 2022-10-03 RX ORDER — PROPOFOL 10 MG/ML
INJECTION, EMULSION INTRAVENOUS
Status: DISCONTINUED | OUTPATIENT
Start: 2022-10-03 | End: 2022-10-03

## 2022-10-03 RX ORDER — METOCLOPRAMIDE HYDROCHLORIDE 5 MG/ML
10 INJECTION INTRAMUSCULAR; INTRAVENOUS EVERY 10 MIN PRN
Status: DISCONTINUED | OUTPATIENT
Start: 2022-10-03 | End: 2022-10-03

## 2022-10-03 RX ORDER — MORPHINE SULFATE 10 MG/ML
2 INJECTION INTRAMUSCULAR; INTRAVENOUS; SUBCUTANEOUS
Status: DISCONTINUED | OUTPATIENT
Start: 2022-10-03 | End: 2022-10-05 | Stop reason: HOSPADM

## 2022-10-03 RX ORDER — OXYCODONE HYDROCHLORIDE 5 MG/1
10 TABLET ORAL EVERY 4 HOURS PRN
Status: DISCONTINUED | OUTPATIENT
Start: 2022-10-03 | End: 2022-10-05 | Stop reason: HOSPADM

## 2022-10-03 RX ORDER — CALCITRIOL 0.25 UG/1
0.25 CAPSULE ORAL 2 TIMES DAILY
Status: DISCONTINUED | OUTPATIENT
Start: 2022-10-03 | End: 2022-10-05 | Stop reason: HOSPADM

## 2022-10-03 RX ORDER — CALCIUM CARBONATE 200(500)MG
2000 TABLET,CHEWABLE ORAL
Status: DISCONTINUED | OUTPATIENT
Start: 2022-10-03 | End: 2022-10-04

## 2022-10-03 RX ORDER — HYDROCODONE BITARTRATE AND ACETAMINOPHEN 5; 325 MG/1; MG/1
1 TABLET ORAL EVERY 4 HOURS PRN
Status: DISCONTINUED | OUTPATIENT
Start: 2022-10-03 | End: 2022-10-05 | Stop reason: HOSPADM

## 2022-10-03 RX ADMIN — FENTANYL CITRATE 50 MCG: 50 INJECTION, SOLUTION INTRAMUSCULAR; INTRAVENOUS at 11:10

## 2022-10-03 RX ADMIN — PROPOFOL 100 MG: 10 INJECTION, EMULSION INTRAVENOUS at 11:10

## 2022-10-03 RX ADMIN — MIDAZOLAM 2 MG: 1 INJECTION INTRAMUSCULAR; INTRAVENOUS at 11:10

## 2022-10-03 RX ADMIN — FENTANYL CITRATE 25 MCG: 50 INJECTION, SOLUTION INTRAMUSCULAR; INTRAVENOUS at 01:10

## 2022-10-03 RX ADMIN — CALCIUM CARBONATE 2000 MG: 500 TABLET, CHEWABLE ORAL at 03:10

## 2022-10-03 RX ADMIN — ONDANSETRON 8 MG: 2 INJECTION INTRAMUSCULAR; INTRAVENOUS at 09:10

## 2022-10-03 RX ADMIN — SODIUM CHLORIDE, SODIUM LACTATE, POTASSIUM CHLORIDE, AND CALCIUM CHLORIDE: .6; .31; .03; .02 INJECTION, SOLUTION INTRAVENOUS at 05:10

## 2022-10-03 RX ADMIN — PROPOFOL 20 MG: 10 INJECTION, EMULSION INTRAVENOUS at 11:10

## 2022-10-03 RX ADMIN — DEXTROSE 2 G: 50 INJECTION, SOLUTION INTRAVENOUS at 11:10

## 2022-10-03 RX ADMIN — HYDROMORPHONE HYDROCHLORIDE 0.2 MG: 2 INJECTION INTRAMUSCULAR; INTRAVENOUS; SUBCUTANEOUS at 03:10

## 2022-10-03 RX ADMIN — ONDANSETRON 4 MG: 2 INJECTION, SOLUTION INTRAMUSCULAR; INTRAVENOUS at 01:10

## 2022-10-03 RX ADMIN — DEXAMETHASONE SODIUM PHOSPHATE 8 MG: 4 INJECTION, SOLUTION INTRA-ARTICULAR; INTRALESIONAL; INTRAMUSCULAR; INTRAVENOUS; SOFT TISSUE at 12:10

## 2022-10-03 RX ADMIN — PROPOFOL 100 MCG/KG/MIN: 10 INJECTION, EMULSION INTRAVENOUS at 11:10

## 2022-10-03 RX ADMIN — ACETAMINOPHEN 1000 MG: 10 INJECTION, SOLUTION INTRAVENOUS at 12:10

## 2022-10-03 RX ADMIN — MORPHINE SULFATE 2 MG: 10 INJECTION INTRAVENOUS at 06:10

## 2022-10-03 RX ADMIN — CALCITRIOL 0.25 MCG: 0.25 CAPSULE, LIQUID FILLED ORAL at 03:10

## 2022-10-03 RX ADMIN — ROCURONIUM BROMIDE 10 MG: 10 INJECTION, SOLUTION INTRAVENOUS at 11:10

## 2022-10-03 RX ADMIN — Medication 400 MG: at 03:10

## 2022-10-03 RX ADMIN — ACETAMINOPHEN 650 MG: 325 TABLET ORAL at 11:10

## 2022-10-03 RX ADMIN — REMIFENTANIL HYDROCHLORIDE 0.1 MCG/KG/MIN: 1 INJECTION, POWDER, LYOPHILIZED, FOR SOLUTION INTRAVENOUS at 11:10

## 2022-10-03 RX ADMIN — OXYCODONE HYDROCHLORIDE 10 MG: 5 TABLET ORAL at 08:10

## 2022-10-03 RX ADMIN — PROPOFOL 80 MG: 10 INJECTION, EMULSION INTRAVENOUS at 11:10

## 2022-10-03 RX ADMIN — OXYCODONE HYDROCHLORIDE 10 MG: 5 TABLET ORAL at 03:10

## 2022-10-03 RX ADMIN — SUCCINYLCHOLINE CHLORIDE 200 MG: 20 INJECTION, SOLUTION INTRAMUSCULAR; INTRAVENOUS at 11:10

## 2022-10-03 RX ADMIN — Medication 80 MG: at 11:10

## 2022-10-03 RX ADMIN — FENTANYL CITRATE 25 MCG: 50 INJECTION, SOLUTION INTRAMUSCULAR; INTRAVENOUS at 03:10

## 2022-10-03 RX ADMIN — SODIUM CHLORIDE, SODIUM LACTATE, POTASSIUM CHLORIDE, AND CALCIUM CHLORIDE: .6; .31; .03; .02 INJECTION, SOLUTION INTRAVENOUS at 10:10

## 2022-10-03 NOTE — TRANSFER OF CARE
"Anesthesia Transfer of Care Note    Patient: Barbi Munoz    Procedure(s) Performed: Procedure(s) (LRB):  DISSECTION, NECK (Left)  THYROIDECTOMY (Bilateral)    Patient location: PACU    Anesthesia Type: general    Transport from OR: Transported from OR on room air with adequate spontaneous ventilation    Post pain: adequate analgesia    Post assessment: no apparent anesthetic complications and tolerated procedure well    Post vital signs: stable    Level of consciousness: awake and alert    Nausea/Vomiting: no nausea/vomiting    Complications: none    Transfer of care protocol was followed      Last vitals:   Visit Vitals  /82 (BP Location: Right arm, Patient Position: Sitting)   Pulse 68   Temp 36.7 °C (98 °F) (Temporal)   Resp 18   Ht 5' 4" (1.626 m)   Wt 58.7 kg (129 lb 6.6 oz)   LMP 09/05/2022 (Exact Date)   SpO2 99%   Breastfeeding No   BMI 22.21 kg/m²     "

## 2022-10-03 NOTE — ANESTHESIA POSTPROCEDURE EVALUATION
Anesthesia Post Evaluation    Patient: Barbi Munoz    Procedure(s) Performed: Procedure(s) (LRB):  DISSECTION, NECK (Left)  THYROIDECTOMY (Bilateral)    Final Anesthesia Type: general      Patient location during evaluation: PACU  Patient participation: Yes- Able to Participate  Level of consciousness: awake and alert  Post-procedure vital signs: reviewed and stable  Pain management: adequate  Airway patency: patent    PONV status at discharge: No PONV  Anesthetic complications: no      Cardiovascular status: blood pressure returned to baseline  Respiratory status: unassisted  Hydration status: euvolemic  Follow-up not needed.          Vitals Value Taken Time   /66 10/03/22 1618   Temp 36.7 °C (98.1 °F) 10/03/22 1505   Pulse 58 10/03/22 1621   Resp 9 10/03/22 1621   SpO2 98 % 10/03/22 1621   Vitals shown include unvalidated device data.      No case tracking events are documented in the log.      Pain/Anshul Score: Pain Rating Prior to Med Admin: 7 (10/3/2022  3:58 PM)  Anshul Score: 10 (10/3/2022  4:05 PM)

## 2022-10-03 NOTE — ANESTHESIA PREPROCEDURE EVALUATION
10/03/2022  Barbi Munoz is a 42 y.o., female.  Pre-operative evaluation for Procedure(s) (LRB):  DISSECTION, NECK (Left)    Barbi Munoz is a 42 y.o. female     Patient Active Problem List   Diagnosis    Papillary thyroid carcinoma       Review of patient's allergies indicates:   Allergen Reactions    Gluten     Nickel     Latex Rash    Sulfa (sulfonamide antibiotics) Rash       No current facility-administered medications on file prior to encounter.     Current Outpatient Medications on File Prior to Encounter   Medication Sig Dispense Refill    ARMOUR THYROID 15 mg Tab Take 15 mg by mouth once daily.      thyroid, pork, (ARMOUR THYROID) 30 mg Tab [place] Thyroid 30 mg tablet   TAKE 1 TABLET BY MOUTH EVERY DAY WITH 15MG      benzoyl peroxide (BENZAC AC) 10 % external wash Acne Control Cleanser 10 % topical cleanser   APPLY TO SKIN FOR 5 MINUTES THEN RINSE DAILY      clindamycin (CLEOCIN T) 1 % external solution clindamycin phosphate 1 % topical solution         Past Surgical History:   Procedure Laterality Date    ADENOIDECTOMY       SECTION      x 3    CYSTOSCOPY      TONSILLECTOMY         Social History     Socioeconomic History    Marital status:    Tobacco Use    Smoking status: Former     Types: Cigarettes     Quit date:      Years since quittin.7     Passive exposure: Never    Smokeless tobacco: Never   Substance and Sexual Activity    Alcohol use: Not Currently     Comment: rarely    Drug use: Never         CBC: No results for input(s): WBC, RBC, HGB, HCT, PLT, MCV, MCH, MCHC in the last 72 hours.    CMP: No results for input(s): NA, K, CL, CO2, BUN, CREATININE, GLU, MG, PHOS, CALCIUM, ALBUMIN, PROT, ALKPHOS, ALT, AST, BILITOT in the last 72 hours.    INR  No results for input(s): PT, INR, PROTIME, APTT in the last 72  hours.        Diagnostic Studies:      EKD Echo:  No results found for this or any previous visit.      Pre-op Assessment    I have reviewed the Patient Summary Reports.     I have reviewed the Nursing Notes. I have reviewed the NPO Status.   I have reviewed the Medications.     Review of Systems  Anesthesia Hx:  No problems with previous Anesthesia  History of prior surgery of interest to airway management or planning: Denies Family Hx of Anesthesia complications.   Denies Personal Hx of Anesthesia complications.   Social:  Former Smoker    Hematology/Oncology:  Hematology Normal      Current/Recent Cancer. Oncology Comments: Papillary thyroid carcinoma    EENT/Dental:EENT/Dental Normal   Cardiovascular:   Denies Dysrhythmias.   Denies Angina.  Denies CHF.    Pulmonary:  Pulmonary Normal    Hepatic/GI:   Celiac disease   Musculoskeletal:  Musculoskeletal Normal    Neurological:  Neurology Normal    Endocrine:   Hashimoto's disease   Psych:  Psychiatric Normal           Physical Exam  General: Well nourished    Airway:  Mallampati: II   Mouth Opening: Normal  TM Distance: Normal  Tongue: Normal  Neck ROM: Normal ROM    Dental:  Intact    Chest/Lungs:  Normal Respiratory Rate    Heart:  Rate: Normal  Rhythm: Regular Rhythm        Anesthesia Plan  Type of Anesthesia, risks & benefits discussed:    Anesthesia Type: Gen ETT  Intra-op Monitoring Plan: Standard ASA Monitors  Post Op Pain Control Plan: multimodal analgesia and IV/PO Opioids PRN  Induction:  IV  Airway Plan: Direct, Post-Induction  Informed Consent: Informed consent signed with the Patient and all parties understand the risks and agree with anesthesia plan.  All questions answered.   ASA Score: 2  Day of Surgery Review of History & Physical: H&P Update referred to the surgeon/provider.    Ready For Surgery From Anesthesia Perspective.     .

## 2022-10-03 NOTE — PLAN OF CARE
Reviewed and completed all PACU orders. I encouraged questions, answered them thoroughly, and evaluated my instructions via teach-back method. I have disconnected patient from monitoring equipment and prepared them for the next phase of care. Patient has met all PACU discharge criteria at this point.

## 2022-10-03 NOTE — CARE UPDATE
Received pt. from PACU nurse WOLFGANG Garcia. Pt. alert and oriented x4. Resp. even and unlabored. No acute distress noted. Sx incision noted to anterior portion of neck with KALEIGH drain noted to site. Scant amount of sanguinous fluid noted in compressed bulb. Safety measures intact and ongoing . Family members at bedside.Will continue to monitor per POC.

## 2022-10-03 NOTE — OP NOTE
Operative Note       Surgery Date: 10/3/2022     Surgeon: Jas Reed MD    Assistant:  Treasure PARDOA    Implants- None    Pre-op Diagnosis:  Papillary thyroid carcinoma    Post-op Diagnosis: same    Anesthesia: GETA    Technical Procedures Used:     Total thyroidectomy with central neck dissection (24404)  Left modified radical neck dissection (387)    Findings:  The bilateral recurrent laryngeal nerve(s) was identified and preserved.   The NIMS system was used at 0.5 mA with confirmed stimulation of the nerve(s) immediately prior to closure.  The superior parathyroid glands were identified and preserved.  There were several enlarged and firm lymph nodes in left levels 3,4,5.  There were moderately enlarged and firm lymph nodes in the left paratracheal space.  There were no grossly irregular lymph nodes in the right paratracheal space.  There was a very firm nodule in the left thyroid lobe.  There was no significant substernal extension of the thyroid or gross invasion of the strap muscles.         Estimated Blood Loss:  30 mL                     Specimens:   Specimens (From admission, onward)       Start     Ordered    10/03/22 1356  Specimen to Pathology, Surgery ENT  Once        Comments: Pre-op Diagnosis: Papillary thyroid carcinoma [C73]Procedure(s):DISSECTION, NECKTHYROIDECTOMY Number of specimens: 4Name of specimens:   1. Left level 4 & 5 lymph nodes   2. Left level 3 lymph nodes   3. Left level 2 lymph nodes   4. Total thyroid stitch right upper pole  5. Paratracheal lymph nodes     References:    Click here for ordering Quick Tip   Question Answer Comment   Procedure Type: ENT    Specimen Class: Known or suspected malignancy    Which provider would you like to cc? JAS REED    Release to patient Immediate        10/03/22 5524                           Indications: she had a left sided thyroid nodule and an abnormal left lateral neck lymph node on ultrasound. She had FNA performed of the thyroid  nodule which returned as nondiagnostic.  This was repeated and returned as [place] 3 then was send for Afirma which returned as low risk on genetic profiling.  She also had FNA performed on the lymph node.  This was read as significantly irregular but not fully diagnostic of papillary thyroid cancer.  Tg washout was then performed on the specimen and this returned as >8000, highly consistent with tissue of thyroid origin in a lateral lymph node.  She has had a CT of the neck performed which demonstrated a calcified, enlarged lymph node in left level 4.    Procedure Details   The patient was seen in the Holding Room. The risks, benefits, complications, treatment options, and expected outcomes were discussed with the patient. The possibilities of reaction to medication, pulmonary aspiration, perforation of viscus, bleeding, recurrent infection, finding a normal thyroid, recurrently laryngeal nerve damage, shoulder weakness, the need for additional procedures, failure to diagnose a condition, and creating a complication requiring transfusion or operation were discussed with the patient. The patient concurred with the proposed plan, giving informed consent.  The site of surgery properly noted/marked. The patient was taken to Operating Room, identified as Barbi Munoz and the procedure verified as Thyroidectomy with central neck dissection and left modified radical neck dissection. A Time Out was held and the above information confirmed.    The patient was placed supine after induction of a general anesthetic.  The neck was extended and prepped and draped in standard fashion.  A 7 cm transverse cervical incision was created above the sternal notch within a natural skin fold extending to the left just over the medial border of the sternocleidomastoid muscle.The incision was carried down through subcutaneous tissues and platysmal muscle with the use of electrocautery.  A subplatysmal flap was elevated  anteriorly to the midline as well as to the superior border of the clavicle.  A superior subplatysmal flap was also elevated to the level of the digastric muscles and posteriorly to the medial tip of the mastoid. The skin flaps were retracted in position with fish hooks.      Dissection initially began with identification of the anterior belly of the digastric.  The digastric was then dissected along posteriorly to the sternocleidomastoid,  the surrounding soft tissues from the submandibular gland leaving the submandibular gland intact.  Then the fascia was incised along the sternocleidomastoid and this was dissected anteriorly until the spinal accessory nerve was identified.  The spinal accessory nerve was then dissected superiorly until passing below the digastric.  The nerve was eventually traced going up toward the jugular foramen. The plane of dissection was then continued from lateral to medial with retraction of the sternocleidomastoid until the deep cervical rootlets were identified and preserved.  The floor of the neck was then identified and the dissection was carried medially until the internal jugular vein was identified.  The dissection then continued in the plane of the floor of then neck working from superior to inferior until the omohyoid was identified.   The omohyoid fascia was then incised and the muscle itself circumferentially dissected, allowing it to be retracted, allowing access to level IV of the neck.  The transverse cervical artery and vein were identified, defining the inferior extent of the dissection.  I then continued to dissect beneath the sternocleidomastoid muscle until was able to access level 5 of the neck.  Working then medially, the inferior aspect of the specimen.   As the dissection continued medially, the internal jugular vein was again encountered.  During the dissection, care was taken not to injure the vagus nerve.  The dissection continued medially over the  internal jugular vein, freed from the carotid artery and continued medially to the strap muscles, which were preserved.  The specimen was then removed.  Working from inferior to superior, the specimen was segmented in order to delineate the lymph node stations and placed into a separate specimen containers.  This was then sent to pathology.    The strap muscles were identified and divided at the midline.  Sharp dissection was used to mobilize the left thyroid lobe in a medial direction.  Dissection continued posteriorly to expose the tracheoesophageal groove and carotid artery.  The recurrent laryngeal nerve was identified and preserved.  The thyroid lobe was mobilized further and the superior and inferior pole vessels were divided with the harmonic scalpel.  The middle thyroid vein was divided with the harmonic scalpel.  Small vessels were likewise divided.  The gland was rotated in a medial direction and taken off the trachea using the bipolar.  The isthmus was removed off the thyroid cartilage using the bipolar.   Sharp dissection was used to mobilize the contralateral thyroid lobe in a medial direction.  Dissection continued posteriorly to expose the tracheoesophageal groove and carotid artery.  The recurrent laryngeal nerve was identified and preserved.  The thyroid lobe was mobilized further and the superior and inferior pole vessels were divided with the harmonic scalpel.  The middle thyroid vein was divided with the harmonic scalpel.  Small vessels were likewise divided.  The gland was rotated in a medial direction and taken off the trachea using the bipolar.  The gland was then inspected on the back table for any unintentional containment of parathyroid tissue which was not noted to be present on my review.  A suture was placed for orientation purposes. The specimen was submitted to pathology for permanent pathology.    I then proceeded with a central neck dissection.  I began on the left side by dissecting  along the course the insertion port of the recurrent laryngeal nerve and dissected down to the thoracic inlet.  Then working between the trachea and the carotid artery, of the intervening soft tissue was removed using the Harmonic.  Of note, there was a small but very firm lymph node immediately posterior to the recurrent laryngeal nerve.  There were multiple other moderately enlarged lymph nodes in the area.  The pretracheal lymph nodes were then removed with the Harmonic.  I then proceeded to dissect along the right recurrent laryngeal nerve also from its entry point into the thoracic inlet and the fibrofatty tissue was then removed around the nerve.  Of note this area had a paucity of lymph nodes and fibrofatty tissue.      The wound was irrigated and inspected carefully.  The parathyroid tissue was found to be viable and the recurrent laryngeal nerve was left intact in its anatomic locations.  A 15 Fr Earl-Gardner drain was placed within the depths of the wound, exiting the lateral aspect of the wound and secured with 3-0 Prolene.The strap muscles were closed with 3-0 Vicryl suture.  The platysma was closed with 3-0 Vicryl interrupted  sutures.  The skin incision was closed with a running 4-0 Stratafix subcuticular closure.  Dermabond Prineo was used to seal the incision.    Instrument, sponge, and needle counts were correct prior to closure and at the conclusion of the case.              Complications:  None; patient tolerated the procedure well.           Disposition: PACU - hemodynamically stable.           Condition: stable.    Attending Attestation: I performed the procedure.

## 2022-10-04 LAB
CALCIUM SERPL-MCNC: 8 MG/DL (ref 8.7–10.5)
CALCIUM SERPL-MCNC: 8.1 MG/DL (ref 8.7–10.5)
MAGNESIUM SERPL-MCNC: 1.8 MG/DL (ref 1.6–2.6)

## 2022-10-04 PROCEDURE — 63600175 PHARM REV CODE 636 W HCPCS: Performed by: OTOLARYNGOLOGY

## 2022-10-04 PROCEDURE — 11000001 HC ACUTE MED/SURG PRIVATE ROOM

## 2022-10-04 PROCEDURE — 83735 ASSAY OF MAGNESIUM: CPT | Performed by: OTOLARYNGOLOGY

## 2022-10-04 PROCEDURE — 25000003 PHARM REV CODE 250: Performed by: OTOLARYNGOLOGY

## 2022-10-04 PROCEDURE — 82310 ASSAY OF CALCIUM: CPT | Performed by: OTOLARYNGOLOGY

## 2022-10-04 RX ORDER — SODIUM CHLORIDE, SODIUM LACTATE, POTASSIUM CHLORIDE, CALCIUM CHLORIDE 600; 310; 30; 20 MG/100ML; MG/100ML; MG/100ML; MG/100ML
INJECTION, SOLUTION INTRAVENOUS CONTINUOUS
Status: DISCONTINUED | OUTPATIENT
Start: 2022-10-04 | End: 2022-10-05 | Stop reason: HOSPADM

## 2022-10-04 RX ORDER — CELECOXIB 100 MG/1
200 CAPSULE ORAL 2 TIMES DAILY
Status: DISCONTINUED | OUTPATIENT
Start: 2022-10-04 | End: 2022-10-05 | Stop reason: HOSPADM

## 2022-10-04 RX ORDER — CALCIUM CARBONATE 200(500)MG
2000 TABLET,CHEWABLE ORAL EVERY 6 HOURS SCHEDULED
Status: DISCONTINUED | OUTPATIENT
Start: 2022-10-04 | End: 2022-10-05 | Stop reason: HOSPADM

## 2022-10-04 RX ADMIN — ONDANSETRON 8 MG: 2 INJECTION INTRAMUSCULAR; INTRAVENOUS at 02:10

## 2022-10-04 RX ADMIN — SODIUM CHLORIDE, SODIUM LACTATE, POTASSIUM CHLORIDE, AND CALCIUM CHLORIDE: .6; .31; .03; .02 INJECTION, SOLUTION INTRAVENOUS at 06:10

## 2022-10-04 RX ADMIN — CALCIUM CARBONATE 2000 MG: 500 TABLET, CHEWABLE ORAL at 08:10

## 2022-10-04 RX ADMIN — HYDROCODONE BITARTRATE AND ACETAMINOPHEN 1 TABLET: 5; 325 TABLET ORAL at 06:10

## 2022-10-04 RX ADMIN — CALCITRIOL 0.25 MCG: 0.25 CAPSULE, LIQUID FILLED ORAL at 09:10

## 2022-10-04 RX ADMIN — Medication 400 MG: at 08:10

## 2022-10-04 RX ADMIN — Medication 400 MG: at 09:10

## 2022-10-04 RX ADMIN — CELECOXIB 200 MG: 100 CAPSULE ORAL at 08:10

## 2022-10-04 RX ADMIN — HYDROCODONE BITARTRATE AND ACETAMINOPHEN 1 TABLET: 5; 325 TABLET ORAL at 12:10

## 2022-10-04 RX ADMIN — CALCITRIOL 0.25 MCG: 0.25 CAPSULE, LIQUID FILLED ORAL at 08:10

## 2022-10-04 RX ADMIN — CELECOXIB 200 MG: 100 CAPSULE ORAL at 09:10

## 2022-10-04 RX ADMIN — CALCIUM CARBONATE 2000 MG: 500 TABLET, CHEWABLE ORAL at 11:10

## 2022-10-04 RX ADMIN — CALCIUM CARBONATE 2000 MG: 500 TABLET, CHEWABLE ORAL at 12:10

## 2022-10-04 RX ADMIN — CALCIUM CARBONATE 2000 MG: 500 TABLET, CHEWABLE ORAL at 06:10

## 2022-10-04 RX ADMIN — ACETAMINOPHEN 650 MG: 325 TABLET ORAL at 04:10

## 2022-10-04 RX ADMIN — MORPHINE SULFATE 2 MG: 10 INJECTION INTRAVENOUS at 08:10

## 2022-10-04 NOTE — NURSING
Received pt. from WOLFGANG Moody. Pt. alert and oriented x4. Resp. even and unlabored. No acute distress noted. Sx incision noted to anterior portion of neck with KALEIGH drain noted to site. Scant amount of sanguinous fluid noted in compressed bulb. Safety measures intact and ongoing . Family members at bedside.Will continue to monitor per POC.

## 2022-10-04 NOTE — NURSING
Msg to pt from Dr. Navarro: call her endocrinologist after discharge for a recommended new dose of [place] thyroid. Also, pt authorized to take home [place] thyroid in the morning.

## 2022-10-04 NOTE — PROGRESS NOTES
"[facility] - Med/Surg  Otorhinolaryngology-Head & Neck Surgery  Progress Note    Patient Name: Barbi Munoz  MRN: [id number]  Code Status: No Order  Admission Date: 10/3/2022  Hospital Length of Stay: 1 days  Attending Physician: Francisco Navarro MD  Primary Care Provider: Primary Doctor No    Subjective:     Interval History: doing fairly well.  Moderate pain overnight.  No symptoms of hypocalcemia    Post-Op Info:  Procedure(s) (LRB):  DISSECTION, NECK (Left)  THYROIDECTOMY (Bilateral)   1 Day Post-Op  Hospital Day: 2      Medications:  Continuous Infusions:  Scheduled Meds:   calcitRIOL  0.25 mcg Oral BID    calcium carbonate  2,000 mg Oral TID WM    celecoxib  200 mg Oral BID    levothyroxine  150 mcg Oral Before breakfast    magnesium oxide  400 mg Oral BID     PRN Meds:acetaminophen, HYDROcodone-acetaminophen, morphine, ondansetron, oxyCODONE     Objective:     Vital Signs (24h Range):  Temp:  [96.7 °F (35.9 °C)-98.6 °F (37 °C)] 96.7 °F (35.9 °C)  Pulse:  [51-72] 70  Resp:  [12-18] 16  SpO2:  [95 %-100 %] 98 %  BP: ()/(55-82) 103/65       Lines/Drains/Airways       Drain  Duration                  Closed/Suction Drain 10/03/22 1431 Left Neck Bulb 15 Fr. <1 day              Peripheral Intravenous Line  Duration                  Peripheral IV - Single Lumen 10/03/22 1031 20 G Right;Posterior Hand <1 day                    Physical Exam  Physical Exam:  Vitals:  /65 (BP Location: Right arm, Patient Position: Sitting)   Pulse 70   Temp 96.7 °F (35.9 °C) (Oral)   Resp 16   Ht 5' 4" (1.626 m)   Wt 58.7 kg (129 lb 6.6 oz)   LMP 09/05/2022 (Exact Date)   SpO2 98%   Breastfeeding No   BMI 22.21 kg/m²   General appearance:  Well developed, well nourished    Eyes:  Extraocular motions intact, PERRL    Communication:  no hoarseness, no dysphonia    Ears:  Otoscopy of external auditory canals and tympanic membranes was normal, clinical speech reception thresholds grossly intact, no mass/lesion of " auricle.  Nose:  No masses/lesions of external nose, nasal mucosa, septum, and turbinates were within normal limits.  Mouth:  No mass/lesion of lips, teeth, gums, hard/soft palate, tongue, tonsils, or oropharynx.    Cardiovascular:  No pedal edema; Radial Pulses +2     Neck & Lymphatics:  No cervical lymphadenopathy, no neck mass/crepitus/ asymmetry, trachea is midline, no thyroid enlargement/tenderness/mass. Incision c/d/I.  KALEIGH with serosanginous output.     Psych: Oriented x3,  Alert with normal mood and affect.     Respiration/Chest:  Symmetric expansion during respiration, normal respiratory effort.    Skin:  Warm and intact. No ulcerations of face, scalp, neck.          Significant Labs:  Lab Results   Component Value Date    IOPTH 6.3 (L) 10/03/2022    PTH 57.1 09/19/2022    CALCIUM 8.1 (L) 10/04/2022    CALCIUM 8.7 10/03/2022    CALCIUM 9.8 09/19/2022    CALCIUM 9.8 09/19/2022           Assessment/Plan:     AM calcium decreased from last night but drawn prior to am dose of calcium.  Recheck at noon.   KALEIGH with significant output as expected.  Neck is soft.  Continue to monitor.     Francisco Navarro MD  Otorhinolaryngology-Head & Neck Surgery  [facility]

## 2022-10-04 NOTE — NURSING
Assumed care of patient from WOLFGANG Narayan. Patient vitals stable and resting  in bed with mild to moderate c/o pain. Patient received Norco at 0600. Will continue to assess pain levels. Awaiting early morning labs

## 2022-10-05 ENCOUNTER — PATIENT MESSAGE (OUTPATIENT)
Dept: OTOLARYNGOLOGY | Facility: CLINIC | Age: 42
End: 2022-10-05
Payer: COMMERCIAL

## 2022-10-05 LAB — CALCIUM SERPL-MCNC: 8.5 MG/DL (ref 8.7–10.5)

## 2022-10-05 PROCEDURE — 63600175 PHARM REV CODE 636 W HCPCS: Performed by: OTOLARYNGOLOGY

## 2022-10-05 PROCEDURE — 25000003 PHARM REV CODE 250: Performed by: OTOLARYNGOLOGY

## 2022-10-05 PROCEDURE — 82310 ASSAY OF CALCIUM: CPT | Performed by: OTOLARYNGOLOGY

## 2022-10-05 RX ORDER — LANOLIN ALCOHOL/MO/W.PET/CERES
400 CREAM (GRAM) TOPICAL 2 TIMES DAILY
Qty: 28 TABLET | Refills: 0 | Status: SHIPPED | OUTPATIENT
Start: 2022-10-05 | End: 2022-10-19

## 2022-10-05 RX ORDER — HYDROCODONE BITARTRATE AND ACETAMINOPHEN 5; 325 MG/1; MG/1
1 TABLET ORAL EVERY 4 HOURS PRN
Qty: 30 TABLET | Refills: 0 | Status: SHIPPED | OUTPATIENT
Start: 2022-10-05

## 2022-10-05 RX ORDER — ONDANSETRON HYDROCHLORIDE 8 MG/1
8 TABLET, FILM COATED ORAL EVERY 8 HOURS PRN
Qty: 15 TABLET | Refills: 1 | Status: SHIPPED | OUTPATIENT
Start: 2022-10-05

## 2022-10-05 RX ORDER — CALCIUM CARBONATE 200(500)MG
2000 TABLET,CHEWABLE ORAL EVERY 6 HOURS
Qty: 480 TABLET | Refills: 11 | Status: SHIPPED | OUTPATIENT
Start: 2022-10-05 | End: 2023-10-05

## 2022-10-05 RX ORDER — CELECOXIB 200 MG/1
200 CAPSULE ORAL 2 TIMES DAILY
Qty: 20 CAPSULE | Refills: 0 | Status: SHIPPED | OUTPATIENT
Start: 2022-10-05 | End: 2022-10-15

## 2022-10-05 RX ORDER — THYROID 90 MG/1
90 TABLET ORAL
Qty: 30 TABLET | Refills: 3 | Status: SHIPPED | OUTPATIENT
Start: 2022-10-07 | End: 2023-10-07

## 2022-10-05 RX ORDER — CALCITRIOL 0.25 UG/1
0.25 CAPSULE ORAL 2 TIMES DAILY
Qty: 60 CAPSULE | Refills: 0 | Status: SHIPPED | OUTPATIENT
Start: 2022-10-05 | End: 2022-11-04

## 2022-10-05 RX ADMIN — ACETAMINOPHEN 650 MG: 325 TABLET ORAL at 07:10

## 2022-10-05 RX ADMIN — CALCIUM CARBONATE 2000 MG: 500 TABLET, CHEWABLE ORAL at 11:10

## 2022-10-05 RX ADMIN — CELECOXIB 200 MG: 100 CAPSULE ORAL at 08:10

## 2022-10-05 RX ADMIN — Medication 400 MG: at 08:10

## 2022-10-05 RX ADMIN — CALCITRIOL 0.25 MCG: 0.25 CAPSULE, LIQUID FILLED ORAL at 08:10

## 2022-10-05 RX ADMIN — ONDANSETRON 8 MG: 2 INJECTION INTRAMUSCULAR; INTRAVENOUS at 07:10

## 2022-10-05 RX ADMIN — CALCIUM CARBONATE 2000 MG: 500 TABLET, CHEWABLE ORAL at 06:10

## 2022-10-05 NOTE — PLAN OF CARE
Discussed Plan of Care with patient and/or family member. Discharge instructions given. Pt getting dressed

## 2022-10-05 NOTE — DISCHARGE INSTRUCTIONS
Thyroid Gland:  Your thyroid gland is in the front lower part of your neck. It makes hormones that help make your body work right. Your thyroid gland has 2 sections. Each section has parathyroid glands.    Thyroidectomy:  Thyroid nodules (round hard lump of cells) are common. If a nodule has cancer in it, then usually the entire thyroid gland is removed, but rarely just half of the gland is removed.  The reason for removing the whole gland rather than just half of the gland is that it lets doctors monitor your blood tests in the future to see if any thyroid tissue is present, suggesting a recurrence.  This is not possible if half of the gland is remaining.  Also, if you need radioactive iodine treatments, there cannot be any remaining thyroid tissue present so that the medicine goes to any microscopic disease in lymph nodes or other parts of your body that may not be detectable before cancer. Some people have a big thyroid that causes problems swallowing or breathing. This is called a goiter and is not cancer.If you have a goiter you may need surgery to take it out.     Dr. Navarro makes an incision (cut) in the lower area of your neck. The exact size of the cut varies, but is usually about 1 1/2 to 2 inches.  He then carefully cuts out the thyroid lobe(s.)  He will find your vocal cord nerve and work around it. There are tiny glands called parathyroid glands that are carefully cut away from the gland and left in the neck.  There are 2 of these glands on each side, and they control the amount of calcium in your blood  If your whole thyroid needs to be taken out, the same process is carried out on the other side.     Your vocal cord is usually not harmed by the surgery. Your voice may be hoarse or weak after surgery. About 10% of patients have vocal cord immobility one one side, but this is permanent in only about 1-5% of cases We will check your vocal cords at your post-operative visit so try not to worry about it  until then.     For patients who have the entire thyroid gland removed (both sides) parathyroid glands may not work as well as they should after surgery. For patients just having one side removed, it is extremely rare for this to be a problem.  If your parathyroid glands are not working well, this can cause your calcium blood levels to drop too low. This can be life-threatening.  This may be a problem for a short time, or it may be long lasting. Up to 30% of patients who have total thyroidectomies will have temporary problems with their parathyroid glands.  Less than 5% of patients have permanent parathyroid problems after thyroid surgery unless there is a more extensive cancer surgery performed at the same time called a central neck dissection.    If you have just one half of your thyroid removed (thyroid lobectomy, or hemithyroidectomy) then you may go home the same day of surgery. You will need someone to drive you home.      If you have your entire thyroid gland removed, you may be admitted to the hospital overnight but sometimes patients go home the same day.  You will be discharged on calcium and vitamin D (calcitriol) supplements as well as magnesium.   The vitamin D supplement (calcitriol) is a prescription.  The magnesium and calcium are over the counter medications.    The easiest way to get the right  Calcium dose and swallow the pills easily is to buy over-the-counter TUMS ULTRA 1000mg chewable tablets.  Take 2 of these every 6 hours with meals.            The magnesium is available in the supplement section of your pharmacy.  Take 250mg of magnesium twice daily.    You will take the calcitriol for 1 month.  The calcium and magnesium are typically tapered by Dr. Navarro or an endocrinologist over a few weeks after surgery.     If you have had your whole thyroid taken out, you will have to take a thyroid hormone pill every day for the rest of your life. Discuss this with Dr. Navarro at your first postoperative  visit.  The exact dose of the med may need to be adjusted over time. We will ask your primary care doctor or endocrinologist (a doctor that treats diseases that affect your glands) to check blood tests 6 weeks after starting your thyroid hormone. Your dose of the thyroid hormone med will be adjusted as needed. Sometimes, your endocrinologist does not want you to start taking this hormone until the tests come back showing you do not have cancer. Your surgeon will let you know.    Post Operative Instructions    Head of Bed:  Please raise the head of your bed 30-45 degrees or sleep in a recliner for the first 3-4 days to decrease swelling. The skin above the incision may look swollen after lying down for a few hours.    Activity:  No straining, heavy lifting, or vigorous exercise for 2 weeks after surgery.  Most patients are able to return to work 1 week after surgery unless their job requires the above activities.    Diet:  Low iodine diet.  Please see instructions below.     Pain:    Your pain can be mild to moderate the first 24 - 48 hours. The pain usually lessens after that. Many patients complain more about a sore throat from the breathing tube used during surgery then about pain from the surgery itself. Your pain will get better in 1-2 days and is best treated with throat lozenges.     You may not need strong narcotic pain medication. The sooner you reduce your narcotic pain medication use, the faster you will heal. As your pain lessens, try using extra-strength acetaminophen (Tylenol) instead of your narcotic med. It is best to reduce your pain to a level you can manage, rather than to get rid of the pain completely.  Please start at a lower of narcotic pain med, and increase the dose only if the pain remains uncontrolled. Decrease the dose if the side effects are too severe.   Do not drive, operate dangerous machinery, or do anything dangerous if you are taking narcotic pain medication (such as oxycodone,  hydrocodone, morphine, etc.) This medication affects your reflexes and responses, just like alcohol.     When to Call Your Surgeon:  If you have    Any concerns. We would much rather that you call your surgeon then worry at home, or get into trouble.    Any numbness or tingling around your mouth or in both of your fingers or toes. This may be a sign of low blood calcium levels.  If you experience this, take 2000mg of TUMS ULTRA (2 tabs) with a glass of milk.  If your symptoms last for 45 minutes then you should have someone drive you to the [facility] room.  . If you have muscle cramping and or curling of your fingers or toes, this could be even more seriously low blood calcium levels. THIS CAN BE A LIFE-THREATENING PROBLEM. If you experience this, take 2000mg of TUMS ULTRA (2 tabs) with a glass of milk then have someone drive you to the [facility] room. You must go have your blood calcium levels drawn immediately.  If you live too far away, go to a nearby ER. Have the ER staff call your Dr. Navarro after drawing your blood calcium and giving you extra calcium if needed. Bring these postoperative instructions with you to show to them. If your blood calcium gets too low, you could have seizures or your heart could stop, so you must take this seriously!     Fever over 101.5 degrees F.     Foul smelling discharge from your incision.    Large amount of bleeding.    More than expected swelling of your neck.    Increase warmth or redness around the incision.     Problems urinating.    Pain that continues to increase instead of decrease.    Wound Care:    YOU MAY REMOVE THE DRAIN WHEN OUTPUT IS LESS THAN 30cc in 24 hours.       DERMABOND® PRINEO® Skin Closure System      It is extremely rare that stitches are placed that need to be removed.  Dr. Navarro will let you know if you are an exception to this, but in the majority of cases, the stitches are placed beneath the skin and are absorbable.    Things to  know  Bathing or showering  You may bathe or shower the same day as surgery unless directed otherwise by Dr. Navarro.   Do not soak or scrub your incision or wound.  After showering or bathing, gently blot your incision or wound dry with a soft towel.  If you experience any redness, swelling, discomfort, warmth or pus, contact Dr. Navarro and he or she will determine how your incision or wound is healing and take the necessary steps to address any issues.  Exercise  Do not engage in strenuous exercise that may cause additional stress on your incision or wound for 2 weeks after surgery.  Light exercise such as going for a walk is recommended as it reduces the chances of clots in the legs or pneumonia.     Ointments or liquids  Topical ointments, liquids or any other product (other than dry  bandages) should not be applied to the incision while DERMABOND  PRINEO System is in place. These may loosen DERMABOND PRINEO  System from the skin before it has completely healed.           Keep up to date on our practice locations and hours!    Let us know how we are doing!              [email or web address]

## 2022-10-05 NOTE — PLAN OF CARE
Patient met criteria for discharge, no complaints of pain, discharge instructions reviewed and patient verbalizes understanding. Patient and her sister taught how to care for drain, including how to empty and record output. Patient and sister understand that once the drain output is less than 30 ml in a 24 hour period that they can discontinue to drain per Dr. Navarro. Patient and her sister instructed on how to remove the drain, her sister has experience with these drains. Prescriptions received from pharmacy. Patient dressed and wheeled to car by RN

## 2022-10-05 NOTE — PROGRESS NOTES
"[facility] - Med/Surg  Otorhinolaryngology-Head & Neck Surgery  Progress Note    Patient Name: Barbi Munoz  MRN: [id number]  Code Status: No Order  Admission Date: 10/3/2022  Hospital Length of Stay: 2 days  Attending Physician: Francisco Navarro MD  Primary Care Provider: Primary Doctor No    Subjective:     Interval History: doing well.  pain improving.  Has had some nausea which improved with Zofran.  No symptoms of hypocalcemia.  Her calcium had slightly dropped when on TID dosing.  Stable on Q6h dosing.     Post-Op Info:  Procedure(s) (LRB):  DISSECTION, NECK (Left)  THYROIDECTOMY (Bilateral)   2 Days Post-Op  Hospital Day: 3      Medications:  Continuous Infusions:   lactated ringers 50 mL/hr at 10/04/22 1816     Scheduled Meds:   calcitRIOL  0.25 mcg Oral BID    calcium carbonate  2,000 mg Oral 4 times per day    celecoxib  200 mg Oral BID    levothyroxine  150 mcg Oral Before breakfast    magnesium oxide  400 mg Oral BID     PRN Meds:acetaminophen, HYDROcodone-acetaminophen, morphine, ondansetron, oxyCODONE     Objective:     Vital Signs (24h Range):  Temp:  [97.6 °F (36.4 °C)] 97.6 °F (36.4 °C)  Pulse:  [70-78] 72  Resp:  [16-18] 16  SpO2:  [98 %] 98 %  BP: (104-116)/(70-75) 116/75       Lines/Drains/Airways       Drain  Duration                  Closed/Suction Drain 10/03/22 1431 Left Neck Bulb 15 Fr. 1 day              Peripheral Intravenous Line  Duration                  Peripheral IV - Single Lumen 10/03/22 1031 20 G Right;Posterior Hand 1 day                    Physical Exam  Physical Exam:  Vitals:  /75 (BP Location: Right arm, Patient Position: Lying)   Pulse 72   Temp 97.6 °F (36.4 °C) (Temporal)   Resp 16   Ht 5' 4" (1.626 m)   Wt 58.7 kg (129 lb 6.6 oz)   LMP 09/05/2022 (Exact Date)   SpO2 98%   Breastfeeding No   BMI 22.21 kg/m²   General appearance:  Well developed, well nourished    Eyes:  Extraocular motions intact, PERRL    Communication:  no hoarseness, no " dysphonia    Ears:  Otoscopy of external auditory canals and tympanic membranes was normal, clinical speech reception thresholds grossly intact, no mass/lesion of auricle.  Nose:  No masses/lesions of external nose, nasal mucosa, septum, and turbinates were within normal limits.  Mouth:  No mass/lesion of lips, teeth, gums, hard/soft palate, tongue, tonsils, or oropharynx.    Cardiovascular:  No pedal edema; Radial Pulses +2     Neck & Lymphatics:  No cervical lymphadenopathy, no neck mass/crepitus/ asymmetry, trachea is midline,  Incision c/d/I.  KALEIGH with serosanginous output.     Psych: Oriented x3,  Alert with normal mood and affect.     Respiration/Chest:  Symmetric expansion during respiration, normal respiratory effort.    Skin:  Warm and intact. No ulcerations of face, scalp, neck.          Significant Labs:  Lab Results   Component Value Date    IOPTH 6.3 (L) 10/03/2022    PTH 57.1 09/19/2022    CALCIUM 8.5 (L) 10/05/2022    CALCIUM 8.0 (L) 10/04/2022    CALCIUM 8.1 (L) 10/04/2022    CALCIUM 8.7 10/03/2022    CALCIUM 9.8 09/19/2022    CALCIUM 9.8 09/19/2022           Assessment/Plan:     Calcium improved with dosing Q6h.  Asymptomatic.  Discharge on this dosing.   Drain to be removed at home.    Follow up next week for pathology review.   Increase Thyroid Sawyer dose to 90 mg on Friday.  Rx sent.   Discharge on Low Iodine Diet.  Instructions given.     Francisco Navarro MD  Otorhinolaryngology-Head & Neck Surgery  [facility] - [facility]

## 2022-10-05 NOTE — DISCHARGE SUMMARY
[facility]  Discharge Note    Admit Date: 10/3/2022    Discharge Date and Time: 10/05/2022 10:07 AM     Attending Physician: Francisco Navarro    Discharge Provider: Francisco Navarro    Diagnoses:  Active Hospital Problems    Diagnosis  POA    *Papillary thyroid carcinoma [C73]  Yes      Resolved Hospital Problems   No resolved problems to display.       Discharged Condition: good    Hospital Course: Patient was admitted after left MRND, Total thyroid and CND.  She had no acute events.  Calcium was low on TID dosing.  Increased to Q6h and this improved.  KALEIGH with 75cc output in the past 24 hours.     Final Diagnoses: Same as principle problem    Disposition: Home or Self Care    Follow up/Patient Instructions:    Medications:  Reconciled Home Medications:   Current Discharge Medication List        START taking these medications    Details   calcitRIOL (ROCALTROL) 0.25 MCG Cap Take 1 capsule (0.25 mcg total) by mouth 2 (two) times daily.  Qty: 60 capsule, Refills: 0      calcium carbonate (TUMS) 200 mg calcium (500 mg) chewable tablet Take 4 tablets (2,000 mg total) by mouth every 6 (six) hours.  Qty: 480 tablet, Refills: 11      celecoxib (CELEBREX) 200 MG capsule Take 1 capsule (200 mg total) by mouth 2 (two) times daily. for 10 days  Qty: 20 capsule, Refills: 0      HYDROcodone-acetaminophen (NORCO) 5-325 mg per tablet Take 1 tablet by mouth every 4 (four) hours as needed for Pain.  Qty: 30 tablet, Refills: 0    Comments: Quantity prescribed more than 7 day supply? No      magnesium oxide (MAG-OX) 400 mg (241.3 mg magnesium) tablet Take 1 tablet (400 mg total) by mouth 2 (two) times daily. for 14 days  Qty: 28 tablet, Refills: 0      ondansetron (ZOFRAN) 8 MG tablet Take 1 tablet (8 mg total) by mouth every 8 (eight) hours as needed for Nausea.  Qty: 15 tablet, Refills: 1           CONTINUE these medications which have CHANGED    Details   thyroid, pork, (ARMOUR THYROID) 90 mg Tab Take 1 tablet (90 mg total) by mouth  before breakfast.  Qty: 30 tablet, Refills: 3           CONTINUE these medications which have NOT CHANGED    Details   benzoyl peroxide (BENZAC AC) 10 % external wash Acne Control Cleanser 10 % topical cleanser   APPLY TO SKIN FOR 5 MINUTES THEN RINSE DAILY      clindamycin (CLEOCIN T) 1 % external solution clindamycin phosphate 1 % topical solution                 Discharge Procedure Orders (must include Diet, Follow-up, Activity):    Low iodine diet  No strenuous activity for 2 weeks  Follow up in about 10 days.

## 2022-10-05 NOTE — PLAN OF CARE
Alert and oriented x4. Resp. even and unlabored. No acute distress noted. KALEIGH drain noted to left side of neck. Minimal bloody output noted. No difficulty breathing or shortness of breath noted. Safety measures intact and ongoing. Will continue to monitor per POC.

## 2022-10-05 NOTE — PLAN OF CARE
Assumed care of patient from WOLFGANG Moody. Patient resting in bed, no complaints of pain at this time, and no signs of distress. VSS. Will continue to monitor.

## 2022-10-06 ENCOUNTER — PATIENT OUTREACH (OUTPATIENT)
Dept: ADMINISTRATIVE | Facility: CLINIC | Age: 42
End: 2022-10-06
Payer: COMMERCIAL

## 2022-10-06 NOTE — PATIENT INSTRUCTIONS
Caitie teaching reviewed with Barbi Munoz . She verbalized understanding.    Education was provided based on the patient's discharge diagnosis using the attached [facility] patient education as a reference.

## 2022-10-06 NOTE — PROGRESS NOTES
C3 nurse spoke with Barbi Munoz for a TCC post hospital discharge follow up call. The patient reports does not have a scheduled HOSFU appointment. C3 nurse was unable to schedule HOSFU appointment for [facility]. Patient advised to contact their PCP to schedule a HOSPFU within 5-7 days.     Patient outside of NP service area.

## 2022-10-07 ENCOUNTER — PATIENT MESSAGE (OUTPATIENT)
Dept: OTOLARYNGOLOGY | Facility: CLINIC | Age: 42
End: 2022-10-07
Payer: COMMERCIAL

## 2022-10-07 LAB
FINAL PATHOLOGIC DIAGNOSIS: NORMAL
GROSS: NORMAL
Lab: NORMAL
MICROSCOPIC EXAM: NORMAL

## 2022-10-08 ENCOUNTER — PATIENT MESSAGE (OUTPATIENT)
Dept: OTOLARYNGOLOGY | Facility: CLINIC | Age: 42
End: 2022-10-08
Payer: COMMERCIAL

## 2022-10-08 ENCOUNTER — TELEPHONE (OUTPATIENT)
Dept: OTOLARYNGOLOGY | Facility: CLINIC | Age: 42
End: 2022-10-08
Payer: COMMERCIAL

## 2022-10-08 DIAGNOSIS — E89.0: ICD-10-CM

## 2022-10-08 DIAGNOSIS — C73 PAPILLARY THYROID CARCINOMA: Primary | ICD-10-CM

## 2022-10-08 NOTE — TELEPHONE ENCOUNTER
----- Message from Dee Jones MA sent at 10/7/2022  2:05 PM CDT -----  Regarding: PT referral  Contact: [facility](Ana)[phone]  Can you put in an external referral for physical therapy, please.   ----- Message -----  From: Bruna Boyd  Sent: 10/7/2022   2:00 PM CDT  To: Ramon Perez is requesting a referral for physical therapy and needs it faxed over to [phone]. Thanks/KJ

## 2022-10-08 NOTE — TELEPHONE ENCOUNTER
Pathology Reviewed    Left neck dissection  2/32 lymph nodes positive  Largest deposit 14.5mm    Central neck  4/14 lymph nodes positive 4.5mm    Thyroid  PTC, Classic 18mm unifocal    pT2N1b  cM0

## 2022-10-09 ENCOUNTER — NURSE TRIAGE (OUTPATIENT)
Dept: ADMINISTRATIVE | Facility: CLINIC | Age: 42
End: 2022-10-09
Payer: COMMERCIAL

## 2022-10-09 NOTE — TELEPHONE ENCOUNTER
Pt called OOC line and stated that she had a BM 2 days ago but very constipated.  Pt reports taking Colace for the last 4 days and drinking plenty of water but is still experiencing constipation.  Pt states she had a thyroridectomy on 10-, she's a patient of Francisco Navarro MD at [facility] in .    Pt was advised to see PCP within 3 days per triage protocol.  Pt verbalized understanding.    Reason for Disposition   Unable to have a bowel movement (BM) without manually removing stool (using finger to pull out stool or perform disimpaction)    Additional Information   Constipation   Negative: Patient sounds very sick or weak to the triager   Negative: [1] Vomiting AND [2] abdomen looks much more swollen than usual   Negative: [1] Vomiting AND [2] contains bile (green color)   Negative: [1] Constant abdominal pain AND [2] present > 2 hours   Negative: [1] Rectal pain or fullness from fecal impaction (rectum full of stool) AND [2] NOT better after SITZ bath, suppository or enema   Negative: [1] Intermittent mild abdominal pain AND [2] fever   Negative: Abdomen is more swollen than usual   Negative: Last bowel movement (BM) > 4 days ago   Negative: Leaking stool    Protocols used: Post-Op Symptoms and Zoozymakx-D-JC, Constipation-A-AH

## 2022-10-11 NOTE — TELEPHONE ENCOUNTER
Called pt and she states the issue has been resolved and she will see Dr Navarro for her upcoming appointment

## 2022-10-13 ENCOUNTER — PATIENT MESSAGE (OUTPATIENT)
Dept: OTOLARYNGOLOGY | Facility: CLINIC | Age: 42
End: 2022-10-13

## 2022-10-13 ENCOUNTER — OFFICE VISIT (OUTPATIENT)
Dept: OTOLARYNGOLOGY | Facility: CLINIC | Age: 42
End: 2022-10-13
Payer: COMMERCIAL

## 2022-10-13 VITALS
TEMPERATURE: 98 F | BODY MASS INDEX: 22.55 KG/M2 | SYSTOLIC BLOOD PRESSURE: 106 MMHG | DIASTOLIC BLOOD PRESSURE: 75 MMHG | WEIGHT: 132.06 LBS | HEART RATE: 66 BPM | HEIGHT: 64 IN

## 2022-10-13 DIAGNOSIS — C73 PAPILLARY THYROID CARCINOMA: Primary | ICD-10-CM

## 2022-10-13 PROCEDURE — 3078F PR MOST RECENT DIASTOLIC BLOOD PRESSURE < 80 MM HG: ICD-10-PCS | Mod: CPTII,S$GLB,, | Performed by: OTOLARYNGOLOGY

## 2022-10-13 PROCEDURE — 3074F PR MOST RECENT SYSTOLIC BLOOD PRESSURE < 130 MM HG: ICD-10-PCS | Mod: CPTII,S$GLB,, | Performed by: OTOLARYNGOLOGY

## 2022-10-13 PROCEDURE — 99999 PR PBB SHADOW E&M-EST. PATIENT-LVL III: CPT | Mod: PBBFAC,,, | Performed by: OTOLARYNGOLOGY

## 2022-10-13 PROCEDURE — 99024 POSTOP FOLLOW-UP VISIT: CPT | Mod: S$GLB,,, | Performed by: OTOLARYNGOLOGY

## 2022-10-13 PROCEDURE — 3078F DIAST BP <80 MM HG: CPT | Mod: CPTII,S$GLB,, | Performed by: OTOLARYNGOLOGY

## 2022-10-13 PROCEDURE — 3074F SYST BP LT 130 MM HG: CPT | Mod: CPTII,S$GLB,, | Performed by: OTOLARYNGOLOGY

## 2022-10-13 PROCEDURE — 1159F MED LIST DOCD IN RCRD: CPT | Mod: CPTII,S$GLB,, | Performed by: OTOLARYNGOLOGY

## 2022-10-13 PROCEDURE — 1159F PR MEDICATION LIST DOCUMENTED IN MEDICAL RECORD: ICD-10-PCS | Mod: CPTII,S$GLB,, | Performed by: OTOLARYNGOLOGY

## 2022-10-13 PROCEDURE — 99999 PR PBB SHADOW E&M-EST. PATIENT-LVL III: ICD-10-PCS | Mod: PBBFAC,,, | Performed by: OTOLARYNGOLOGY

## 2022-10-13 PROCEDURE — 99024 PR POST-OP FOLLOW-UP VISIT: ICD-10-PCS | Mod: S$GLB,,, | Performed by: OTOLARYNGOLOGY

## 2022-10-13 NOTE — PHYSICIAN QUERY
PT Name: Barbi Munoz  MR #: [id number]    DOCUMENTATION CLARIFICATION     CDS/: Jennifer RUIZ, RN            Contact information:  [email or web address]  This form is a permanent document in the medical record.     Query Date: October 12, 2022    By submitting this query, we are merely seeking further clarification of documentation.  Please utilize your independent clinical judgment when addressing the question(s) below.    The medical record contains the following:  Pathology Findings Location in Medical Record      Left level 4 and 5, lymph node dissection:   11 lymph nodes, one positive for metastatic papillary thyroid carcinoma (1/11)     Left level 3, lymph node dissection:   Six lymph nodes, one positive for metastatic papillary carcinoma (1/6)      3 lymph nodes are present, 1 positive for metastatic carcinoma (1/3)     Eleven lymph nodes, 3 positive for metastatic carcinoma (3/11)     Surgical Pathology 10/3/22       Please clarify:  [ x ]  Pathology findings noted above are confirmed as diagnoses      [  ]  Pathology findings noted above are not confirmed as diagnoses     [  ]  Pathology findings noted above are incidental     [  ]  Other diagnosis (please specify): ___________     [  ]  Clinically Undetermined       Please document in your progress notes daily for the duration of treatment until resolved and include in your discharge summary.    Form No. [id number]

## 2022-10-14 ENCOUNTER — PATIENT MESSAGE (OUTPATIENT)
Dept: OTOLARYNGOLOGY | Facility: CLINIC | Age: 42
End: 2022-10-14
Payer: COMMERCIAL

## 2022-10-17 ENCOUNTER — PATIENT MESSAGE (OUTPATIENT)
Dept: OTOLARYNGOLOGY | Facility: CLINIC | Age: 42
End: 2022-10-17
Payer: COMMERCIAL

## 2022-10-18 ENCOUNTER — PATIENT MESSAGE (OUTPATIENT)
Dept: OTOLARYNGOLOGY | Facility: CLINIC | Age: 42
End: 2022-10-18
Payer: COMMERCIAL

## 2022-10-19 ENCOUNTER — PATIENT MESSAGE (OUTPATIENT)
Dept: OTOLARYNGOLOGY | Facility: CLINIC | Age: 42
End: 2022-10-19
Payer: COMMERCIAL

## 2022-10-28 RX ORDER — SODIUM CHLORIDE 0.9 % (FLUSH) 0.9 %
10 SYRINGE (ML) INJECTION
Status: CANCELLED | OUTPATIENT
Start: 2022-11-14

## 2022-10-28 RX ORDER — SODIUM CHLORIDE 0.9 % (FLUSH) 0.9 %
10 SYRINGE (ML) INJECTION
Status: CANCELLED | OUTPATIENT
Start: 2022-11-15

## 2022-10-28 RX ORDER — HEPARIN 100 UNIT/ML
500 SYRINGE INTRAVENOUS
Status: CANCELLED | OUTPATIENT
Start: 2022-11-15

## 2022-10-28 RX ORDER — HEPARIN 100 UNIT/ML
500 SYRINGE INTRAVENOUS
Status: CANCELLED | OUTPATIENT
Start: 2022-11-14

## 2022-10-31 ENCOUNTER — PATIENT MESSAGE (OUTPATIENT)
Dept: OTOLARYNGOLOGY | Facility: CLINIC | Age: 42
End: 2022-10-31
Payer: COMMERCIAL

## 2022-10-31 VITALS
SYSTOLIC BLOOD PRESSURE: 116 MMHG | TEMPERATURE: 98 F | BODY MASS INDEX: 22.1 KG/M2 | HEIGHT: 64 IN | RESPIRATION RATE: 16 BRPM | HEART RATE: 72 BPM | WEIGHT: 129.44 LBS | OXYGEN SATURATION: 98 % | DIASTOLIC BLOOD PRESSURE: 75 MMHG

## 2022-11-02 DIAGNOSIS — C73 MALIGNANT NEOPLASM OF THYROID GLAND: Primary | ICD-10-CM

## 2022-11-02 DIAGNOSIS — C73 THYROID CANCER: Primary | ICD-10-CM

## 2022-11-03 ENCOUNTER — PATIENT MESSAGE (OUTPATIENT)
Dept: OTOLARYNGOLOGY | Facility: CLINIC | Age: 42
End: 2022-11-03
Payer: COMMERCIAL

## 2022-11-03 DIAGNOSIS — C73 PAPILLARY THYROID CARCINOMA: Primary | ICD-10-CM

## 2022-11-10 LAB
DNA RANGE(S) EXAMINED NAR: NORMAL
DNA RANGE(S) EXAMINED NAR: NORMAL
GENE DIS ANL INTERP-IMP: POSITIVE
GENE DIS ANL INTERP-IMP: POSITIVE
GENE DIS ASSESSED: NORMAL
GENE DIS ASSESSED: NORMAL
GENE MUT TESTED BLD/T: 1.1 M/MB
MSI CA SPEC-IMP: NORMAL
MSI CA SPEC-IMP: NOT DETECTED
REASON FOR STUDY: NORMAL
REASON FOR STUDY: NORMAL
TEMPUS AMENDMENTNOTE1: NORMAL
TEMPUS FUSIONADDENDUM: NORMAL
TEMPUS LCA: NORMAL
TEMPUS LCA: NORMAL
TEMPUS PORTAL: NORMAL
TEMPUS PORTAL: NORMAL
TEMPUS THERAPY1: NORMAL
TEMPUS THERAPY2: NORMAL
TEMPUS THERAPYCOUNT: 2
TEMPUS TRIAL1: NORMAL
TEMPUS TRIAL2: NORMAL
TEMPUS TRIAL3: NORMAL
TEMPUS TRIALCOUNT: 3

## 2022-11-14 ENCOUNTER — INFUSION (OUTPATIENT)
Dept: INFUSION THERAPY | Facility: HOSPITAL | Age: 42
End: 2022-11-14
Attending: INTERNAL MEDICINE
Payer: COMMERCIAL

## 2022-11-14 VITALS
RESPIRATION RATE: 18 BRPM | BODY MASS INDEX: 22.55 KG/M2 | SYSTOLIC BLOOD PRESSURE: 114 MMHG | WEIGHT: 132.06 LBS | DIASTOLIC BLOOD PRESSURE: 76 MMHG | HEIGHT: 64 IN | TEMPERATURE: 98 F | HEART RATE: 60 BPM

## 2022-11-14 DIAGNOSIS — C73 PAPILLARY THYROID CARCINOMA: Primary | ICD-10-CM

## 2022-11-14 PROCEDURE — 63600175 PHARM REV CODE 636 W HCPCS: Mod: JG

## 2022-11-14 PROCEDURE — 96372 THER/PROPH/DIAG INJ SC/IM: CPT

## 2022-11-14 RX ORDER — SODIUM CHLORIDE 0.9 % (FLUSH) 0.9 %
10 SYRINGE (ML) INJECTION
Status: DISCONTINUED | OUTPATIENT
Start: 2022-11-14 | End: 2022-11-14 | Stop reason: HOSPADM

## 2022-11-14 RX ORDER — HEPARIN 100 UNIT/ML
500 SYRINGE INTRAVENOUS
Status: DISCONTINUED | OUTPATIENT
Start: 2022-11-14 | End: 2022-11-14 | Stop reason: HOSPADM

## 2022-11-14 RX ADMIN — THYROTROPIN ALFA 0.9 MG: 0.9 INJECTION, POWDER, FOR SOLUTION INTRAMUSCULAR at 10:11

## 2022-11-15 ENCOUNTER — INFUSION (OUTPATIENT)
Dept: INFUSION THERAPY | Facility: HOSPITAL | Age: 42
End: 2022-11-15
Attending: INTERNAL MEDICINE
Payer: COMMERCIAL

## 2022-11-15 VITALS
HEART RATE: 61 BPM | WEIGHT: 132.06 LBS | BODY MASS INDEX: 22.55 KG/M2 | RESPIRATION RATE: 18 BRPM | SYSTOLIC BLOOD PRESSURE: 116 MMHG | DIASTOLIC BLOOD PRESSURE: 69 MMHG | HEIGHT: 64 IN

## 2022-11-15 DIAGNOSIS — C73 PAPILLARY THYROID CARCINOMA: Primary | ICD-10-CM

## 2022-11-15 PROCEDURE — 96372 THER/PROPH/DIAG INJ SC/IM: CPT

## 2022-11-15 PROCEDURE — 63600175 PHARM REV CODE 636 W HCPCS: Mod: JG

## 2022-11-15 RX ORDER — HEPARIN 100 UNIT/ML
500 SYRINGE INTRAVENOUS
Status: DISCONTINUED | OUTPATIENT
Start: 2022-11-15 | End: 2022-11-15 | Stop reason: HOSPADM

## 2022-11-15 RX ORDER — SODIUM CHLORIDE 0.9 % (FLUSH) 0.9 %
10 SYRINGE (ML) INJECTION
Status: DISCONTINUED | OUTPATIENT
Start: 2022-11-15 | End: 2022-11-15 | Stop reason: HOSPADM

## 2022-11-15 RX ADMIN — THYROTROPIN ALFA 0.9 MG: 0.9 INJECTION, POWDER, FOR SOLUTION INTRAMUSCULAR at 10:11

## 2022-11-23 ENCOUNTER — HOSPITAL ENCOUNTER (OUTPATIENT)
Dept: RADIOLOGY | Facility: HOSPITAL | Age: 42
Discharge: HOME | End: 2022-11-23
Attending: INTERNAL MEDICINE
Payer: COMMERCIAL

## 2022-11-23 DIAGNOSIS — C73 THYROID CANCER: ICD-10-CM

## 2022-11-23 PROCEDURE — 78018 THYROID MET IMAGING BODY: CPT | Mod: TC

## 2022-11-30 ENCOUNTER — PATIENT MESSAGE (OUTPATIENT)
Dept: SURGERY | Facility: HOSPITAL | Age: 42
End: 2022-11-30
Payer: COMMERCIAL

## 2023-01-06 ENCOUNTER — HOSPITAL ENCOUNTER (EMERGENCY)
Facility: HOSPITAL | Age: 43
Discharge: HOME | End: 2023-01-06
Attending: STUDENT IN AN ORGANIZED HEALTH CARE EDUCATION/TRAINING PROGRAM
Payer: COMMERCIAL

## 2023-01-06 VITALS
DIASTOLIC BLOOD PRESSURE: 77 MMHG | HEART RATE: 64 BPM | OXYGEN SATURATION: 100 % | SYSTOLIC BLOOD PRESSURE: 116 MMHG | WEIGHT: 129 LBS | TEMPERATURE: 98 F | RESPIRATION RATE: 18 BRPM | BODY MASS INDEX: 22.13 KG/M2

## 2023-01-06 DIAGNOSIS — R00.2 PALPITATIONS: Primary | ICD-10-CM

## 2023-01-06 LAB
ALBUMIN SERPL-MCNC: 4.5 G/DL (ref 3.5–5)
ALBUMIN/GLOB SERPL: 1.6 RATIO (ref 1.1–2)
ALP SERPL-CCNC: 44 UNIT/L (ref 40–150)
ALT SERPL-CCNC: 16 UNIT/L (ref 0–55)
AST SERPL-CCNC: 16 UNIT/L (ref 5–34)
BASOPHILS # BLD AUTO: 0.03 X10(3)/MCL (ref 0–0.2)
BASOPHILS NFR BLD AUTO: 0.6 %
BILIRUBIN DIRECT+TOT PNL SERPL-MCNC: 0.6 MG/DL
BUN SERPL-MCNC: 13.3 MG/DL (ref 7–18.7)
CALCIUM SERPL-MCNC: 10 MG/DL (ref 8.4–10.2)
CHLORIDE SERPL-SCNC: 106 MMOL/L (ref 98–107)
CO2 SERPL-SCNC: 28 MMOL/L (ref 22–29)
CREAT SERPL-MCNC: 0.87 MG/DL (ref 0.55–1.02)
EOSINOPHIL # BLD AUTO: 0.29 X10(3)/MCL (ref 0–0.9)
EOSINOPHIL NFR BLD AUTO: 5.4 %
ERYTHROCYTE [DISTWIDTH] IN BLOOD BY AUTOMATED COUNT: 12.6 % (ref 11–14.5)
GFR SERPLBLD CREATININE-BSD FMLA CKD-EPI: 85 MLS/MIN/1.73/M2
GLOBULIN SER-MCNC: 2.9 GM/DL (ref 2.4–3.5)
GLUCOSE SERPL-MCNC: 94 MG/DL (ref 74–100)
HCT VFR BLD AUTO: 41.2 % (ref 37–47)
HGB BLD-MCNC: 13 GM/DL (ref 12–16)
IMM GRANULOCYTES # BLD AUTO: 0.01 X10(3)/MCL (ref 0–0.04)
IMM GRANULOCYTES NFR BLD AUTO: 0.2 %
LYMPHOCYTES # BLD AUTO: 1.27 X10(3)/MCL (ref 0.6–4.6)
LYMPHOCYTES NFR BLD AUTO: 23.6 %
MCH RBC QN AUTO: 31.2 PG
MCHC RBC AUTO-ENTMCNC: 31.6 MG/DL (ref 33–36)
MCV RBC AUTO: 98.8 FL (ref 80–94)
MONOCYTES # BLD AUTO: 0.43 X10(3)/MCL (ref 0.1–1.3)
MONOCYTES NFR BLD AUTO: 8 %
NEUTROPHILS # BLD AUTO: 3.34 X10(3)/MCL (ref 2.1–9.2)
NEUTROPHILS NFR BLD AUTO: 62.2 %
NRBC BLD AUTO-RTO: 0 % (ref 0–1)
PLATELET # BLD AUTO: 173 X10(3)/MCL (ref 140–371)
PMV BLD AUTO: 10.4 FL (ref 9.4–12.4)
POTASSIUM SERPL-SCNC: 4.3 MMOL/L (ref 3.5–5.1)
PROT SERPL-MCNC: 7.4 GM/DL (ref 6.4–8.3)
RBC # BLD AUTO: 4.17 X10(6)/MCL (ref 4.2–5.4)
SODIUM SERPL-SCNC: 140 MMOL/L (ref 136–145)
T4 FREE SERPL-MCNC: 0.7 NG/DL (ref 0.7–1.48)
TROPONIN I SERPL-MCNC: <0.01 NG/ML (ref 0–0.04)
TROPONIN I SERPL-MCNC: <0.01 NG/ML (ref 0–0.04)
TSH SERPL-ACNC: 2.67 UIU/ML (ref 0.35–4.94)
WBC # SPEC AUTO: 5.4 X10(3)/MCL (ref 4.5–11.5)

## 2023-01-06 PROCEDURE — 84439 ASSAY OF FREE THYROXINE: CPT | Performed by: STUDENT IN AN ORGANIZED HEALTH CARE EDUCATION/TRAINING PROGRAM

## 2023-01-06 PROCEDURE — 84484 ASSAY OF TROPONIN QUANT: CPT | Performed by: STUDENT IN AN ORGANIZED HEALTH CARE EDUCATION/TRAINING PROGRAM

## 2023-01-06 PROCEDURE — 84443 ASSAY THYROID STIM HORMONE: CPT | Performed by: STUDENT IN AN ORGANIZED HEALTH CARE EDUCATION/TRAINING PROGRAM

## 2023-01-06 PROCEDURE — 93010 ELECTROCARDIOGRAM REPORT: CPT | Mod: ,,, | Performed by: INTERNAL MEDICINE

## 2023-01-06 PROCEDURE — 84484 ASSAY OF TROPONIN QUANT: CPT | Performed by: EMERGENCY MEDICINE

## 2023-01-06 PROCEDURE — 80053 COMPREHEN METABOLIC PANEL: CPT | Performed by: EMERGENCY MEDICINE

## 2023-01-06 PROCEDURE — 93005 ELECTROCARDIOGRAM TRACING: CPT

## 2023-01-06 PROCEDURE — 99285 EMERGENCY DEPT VISIT HI MDM: CPT | Mod: 25

## 2023-01-06 PROCEDURE — 93010 EKG 12-LEAD: ICD-10-PCS | Mod: ,,, | Performed by: INTERNAL MEDICINE

## 2023-01-06 PROCEDURE — 85025 COMPLETE CBC W/AUTO DIFF WBC: CPT | Performed by: EMERGENCY MEDICINE

## 2023-01-06 NOTE — ED PROVIDER NOTES
"Encounter Date: 1/6/2023    SCRIBE #1 NOTE: I, Niharika Reyes, am scribing for, and in the presence of,  Dino De Leon MD. I have scribed the following portions of the note - the EKG reading. Other sections scribed: HPI, ROS, Physical exam.     History     Chief Complaint   Patient presents with    Palpitations     Pt c/o waking up this morning with cp/palpitation. Hx thyroid cancer, reports in remission and went up on thyroid medicine yesterday. Denies cardiac hx.        This is a 42 y.o. female, with a PMHx of papillary thyroid carcinoma and celiac disease, who presents with complaint of palpitations with onset this morning. Patient reports that this morning she woke up and after ambulating for a short period of time her palpitations began. She notes that the episode lasted around 3-45 minutes. She adds that she currently feels a "dull" pain on the left side of her chest. Patient denies any cardiac history. Patient reports that yesterday her [place] Thyroid dose was increased. She states that she has a follow up with Dr. Paco Medeiros in a March.    Per chart review, patient has a history of papillary thyroid carcinoma.  Patient had a left neck dissection with 2/32 lymph nodes positive, central neck dissection with 4/14 of notes positive on 11/03/2022, on 11/14/2022 she received a dose of Thyrogen, on 11/15/2022 she received her 2nd dose of Thyrogen.    PCP is Racquel Gallagher MD.      The history is provided by the patient.   Palpitations   This is a new problem. The current episode started 3 to 5 hours ago. The problem has been resolved. On average, each episode lasts 45 minutes. Associated symptoms include chest pain. Pertinent negatives include no fever, no numbness, no abdominal pain, no nausea, no vomiting, no headaches, no dizziness, no weakness, no cough and no shortness of breath.   Review of patient's allergies indicates:   Allergen Reactions    Gluten     Nickel     Latex Rash    Sulfa (sulfonamide " antibiotics) Rash     Past Medical History:   Diagnosis Date    Cancer     Papillary thyroid carcinoma    Celiac disease     Hashimoto's disease     Interstitial cystitis      Past Surgical History:   Procedure Laterality Date    ADENOIDECTOMY       SECTION      x 3    CYSTOSCOPY      DISSECTION OF NECK Left 10/3/2022    Procedure: DISSECTION, NECK;  Surgeon: Francisco Navarro MD;  Location: [facility] OR;  Service: ENT;  Laterality: Left;   central neck dissection, left neck dissection    THYROIDECTOMY Bilateral 10/3/2022    Procedure: THYROIDECTOMY;  Surgeon: Francisco Navarro MD;  Location: [facility] OR;  Service: ENT;  Laterality: Bilateral;  Total thyroidectomy,    TONSILLECTOMY       Family History   Problem Relation Age of Onset    Hypertension Mother     Hypertension Father     Hashimoto's thyroiditis Sister     Arthritis Sister     Autism Brother     Epilepsy Brother     No Known Problems Maternal Grandfather     No Known Problems Maternal Grandmother     No Known Problems Paternal Grandfather     No Known Problems Paternal Grandmother     Ovarian cancer Paternal Aunt      Social History     Tobacco Use    Smoking status: Former     Types: Cigarettes     Quit date:      Years since quittin.0     Passive exposure: Never    Smokeless tobacco: Never   Substance Use Topics    Alcohol use: Not Currently     Comment: rarely    Drug use: Never     Review of Systems   Constitutional:  Negative for chills, fatigue and fever.   HENT:  Negative for congestion, ear discharge, ear pain, nosebleeds, rhinorrhea and sore throat.    Eyes:  Negative for photophobia, pain, discharge and redness.   Respiratory:  Negative for cough, chest tightness, shortness of breath and wheezing.    Cardiovascular:  Positive for chest pain and palpitations. Negative for leg swelling.   Gastrointestinal:  Negative for abdominal pain, blood in stool, constipation, diarrhea, nausea and vomiting.   Genitourinary:  Negative for  dysuria, frequency, hematuria and urgency.   Musculoskeletal:  Negative for arthralgias, myalgias and neck pain.   Skin:  Negative for color change and rash.   Neurological:  Negative for dizziness, seizures, weakness, numbness and headaches.     Physical Exam     Initial Vitals [01/06/23 0804]   BP Pulse Resp Temp SpO2   116/77 64 18 98.1 °F (36.7 °C) 100 %      MAP       --         Physical Exam    Nursing note and vitals reviewed.  Constitutional: She appears well-developed and well-nourished. She is not diaphoretic. No distress.   Patient is well appearing.   HENT:   Head: Normocephalic and atraumatic.   Right Ear: External ear normal.   Left Ear: External ear normal.   Nose: Nose normal.   Mouth/Throat: Oropharynx is clear and moist.   Eyes: Conjunctivae and EOM are normal. Pupils are equal, round, and reactive to light. Right eye exhibits no discharge. Left eye exhibits no discharge. No scleral icterus.   Neck: Neck supple. No tracheal deviation present.   Well healed surgical scar midline at left side of neck.   Normal range of motion.  Cardiovascular:  Normal rate, regular rhythm, normal heart sounds and intact distal pulses.     Exam reveals no gallop and no friction rub.       No murmur heard.  Pulmonary/Chest: Breath sounds normal. No stridor. No respiratory distress. She has no wheezes. She has no rhonchi. She has no rales. She exhibits no tenderness.   Abdominal: Abdomen is soft. Bowel sounds are normal. She exhibits no distension and no mass. There is no abdominal tenderness. There is no guarding.   Musculoskeletal:         General: No tenderness or edema. Normal range of motion.      Cervical back: Normal range of motion and neck supple.     Neurological: She is alert and oriented to person, place, and time. No cranial nerve deficit or sensory deficit.   Skin: Skin is warm and dry. Capillary refill takes less than 2 seconds. No rash noted. No erythema. No pallor.       ED Course   Procedures  Labs  Reviewed   CBC WITH DIFFERENTIAL - Abnormal; Notable for the following components:       Result Value    RBC 4.17 (*)     MCV 98.8 (*)     MCHC 31.6 (*)     All other components within normal limits   TROPONIN I - Normal   TSH - Normal   T4, FREE - Normal   CBC W/ AUTO DIFFERENTIAL    Narrative:     The following orders were created for panel order CBC auto differential.  Procedure                               Abnormality         Status                     ---------                               -----------         ------                     CBC with Differential[[id number]]        Abnormal            Final result                 Please view results for these tests on the individual orders.   COMPREHENSIVE METABOLIC PANEL   TROPONIN I     EKG Readings: (Independently Interpreted)   Initial Reading: No STEMI. Rhythm: Sinus Bradycardia. Heart Rate: 59. ST Segments: Normal ST Segments. Axis: Normal.   Preformed at 08:02. QTC is 423. Good R wave progression. No ST elevation or depression.     Imaging Results              X-Ray Chest 1 View (Final result)  Result time 01/06/23 08:29:13      Final result by Natalio Haywood MD (01/06/23 08:29:13)                   Impression:      No acute chest disease is identified.      Electronically signed by: Natalio Haywood  Date:    01/06/2023  Time:    08:29               Narrative:    EXAMINATION:  XR CHEST 1 VIEW    CLINICAL HISTORY:  , Chest pain, unspecified.    FINDINGS:  No alveolar consolidation, effusion, or pneumothorax is seen.   The thoracic aorta is normal  cardiac silhouette, central pulmonary vessels and mediastinum are normal in size and are grossly unremarkable.   visualized osseous structures are grossly unremarkable.                                       Medications - No data to display  Medical Decision Making:   History:   Old Medical Records: I decided to obtain old medical records.  Old Records Summarized: other records.       <> Summary of Records: Per  chart review, patient has a history of papillary thyroid carcinoma.  Independently Interpreted Test(s):   I have ordered and independently interpreted X-rays - see prior notes.  I have ordered and independently interpreted EKG Reading(s) - see prior notes  Clinical Tests:   Lab Tests: Ordered and Reviewed  Radiological Study: Ordered and Reviewed  Medical Tests: Ordered and Reviewed  ED Management:  MDM  Co-morbidities and/or factors adding to the complexity or risk for the patient:  Patient with history of thyroid cancer, with recent surgery, recent Thyrogen, on armour thyroid  Problems addressed:  Anxiety, hyperthyroid, CAD, AFib, caffeine, electrolyte abnormality  Acute problem/illness or progression/exacerbation of chronic problem with potential threat to life/bodily dysfunction?:  Palpitations, history of thyroid cancer, on thyroid stimulating medications  Differential diagnoses/problems considered: see above   Independent historian (EMS, parent/caregiver, family/friend): -  Decision to obtain previous or outside records?: Yes  Chart Review (nursing home, outside records, CareEverywhere): see above  Review of Rx medications/new Rx prescribed by me:  Patient on [place] thyroid medication with recent medication adjustment, increased dose recently, also on Tums  Labs/imaging/other tests obtained/considered (risk/benefits of testing discussed):  CBC, CMP, TSH, T4, troponin, chest x-ray, EKG  My EKG Interpretation: see above  Labs/tests intepretation:  Patient's labs were largely unrevealing, TSH and T4 within normal limits, albeit per chart review patient's TSH should likely be somewhat lower but she did recently increase her medication, she is follow up in 3-4 month for recheck  My independent imaging interpretation:  Chest x-ray clear from infiltrates, agree with Cardiology interpretation  Point of care US done/interpretation: -  Treatment/interventions, IV medications, new Rx: -  High risk management (IV  controlled substances, admission, plans for OR, DNR, meds requiring monitoring, transfer, etc)?: -  Workup/treatment affected by social determinants of health?:- none   Discussion with consults/radiologist/social work: -  Advanced care planning/DNR/end of life discussion: -  Shared decision making:  Discussed findings with the patient, benign workup here in the emergency department.  She is well-appearing her vitals are stable, her troponin is unremarkable, her EKGs sinus rhythm with no ST elevation or depression.  She is comfortable with a delta troponin and depending on that result will determine dispo.          Scribe Attestation:   Scribe #1: I performed the above scribed service and the documentation accurately describes the services I performed. I attest to the accuracy of the note.    Attending Attestation:           Physician Attestation for Scribe:  Physician Attestation Statement for Scribe #1: I, Dino De Leon MD, reviewed documentation, as scribed by Niharika Reyes in my presence, and it is both accurate and complete.           ED Course as of 01/06/23 2138 Fri Jan 06, 2023   1322 Patient very pleasant.  Reports some palpitations this morning after she woke up began move around the house.  Denies any cardiac history.  History of papillary thyroid carcinoma status post surgery and chemotherapy.  Currently on [place] Thyroid, recently had increased dosed.  Well-appearing, pleasant.  Physical exam benign.  Troponin negative x2, EKG without any ischemic changes.  She is comfortable discharge home.  She has been in touch with her oncologist as well. Return precautions given.  Questions invited, questions answered to the best my ability.  Patient discharged home condition stable.   [MM]      ED Course User Index  [MM] Dino De Leon MD                 Clinical Impression:   Final diagnoses:  [R07.9] Chest pain (Primary)               Dino De Leon MD  01/06/23 2138

## 2023-01-16 ENCOUNTER — PATIENT MESSAGE (OUTPATIENT)
Dept: OTOLARYNGOLOGY | Facility: CLINIC | Age: 43
End: 2023-01-16
Payer: COMMERCIAL

## 2023-01-17 ENCOUNTER — PATIENT MESSAGE (OUTPATIENT)
Dept: OTOLARYNGOLOGY | Facility: CLINIC | Age: 43
End: 2023-01-17
Payer: COMMERCIAL

## 2023-01-23 ENCOUNTER — OFFICE VISIT (OUTPATIENT)
Dept: OTOLARYNGOLOGY | Facility: CLINIC | Age: 43
End: 2023-01-23
Payer: COMMERCIAL

## 2023-01-23 VITALS — WEIGHT: 135.81 LBS | TEMPERATURE: 98 F | BODY MASS INDEX: 23.3 KG/M2

## 2023-01-23 DIAGNOSIS — I89.0 LYMPHEDEMA: Primary | ICD-10-CM

## 2023-01-23 PROCEDURE — 1159F MED LIST DOCD IN RCRD: CPT | Mod: CPTII,S$GLB,, | Performed by: STUDENT IN AN ORGANIZED HEALTH CARE EDUCATION/TRAINING PROGRAM

## 2023-01-23 PROCEDURE — 3008F BODY MASS INDEX DOCD: CPT | Mod: CPTII,S$GLB,, | Performed by: STUDENT IN AN ORGANIZED HEALTH CARE EDUCATION/TRAINING PROGRAM

## 2023-01-23 PROCEDURE — 99213 OFFICE O/P EST LOW 20 MIN: CPT | Mod: S$GLB,,, | Performed by: STUDENT IN AN ORGANIZED HEALTH CARE EDUCATION/TRAINING PROGRAM

## 2023-01-23 PROCEDURE — 3008F PR BODY MASS INDEX (BMI) DOCUMENTED: ICD-10-PCS | Mod: CPTII,S$GLB,, | Performed by: STUDENT IN AN ORGANIZED HEALTH CARE EDUCATION/TRAINING PROGRAM

## 2023-01-23 PROCEDURE — 99999 PR PBB SHADOW E&M-EST. PATIENT-LVL III: ICD-10-PCS | Mod: PBBFAC,,, | Performed by: STUDENT IN AN ORGANIZED HEALTH CARE EDUCATION/TRAINING PROGRAM

## 2023-01-23 PROCEDURE — 99213 PR OFFICE/OUTPT VISIT, EST, LEVL III, 20-29 MIN: ICD-10-PCS | Mod: S$GLB,,, | Performed by: STUDENT IN AN ORGANIZED HEALTH CARE EDUCATION/TRAINING PROGRAM

## 2023-01-23 PROCEDURE — 1159F PR MEDICATION LIST DOCUMENTED IN MEDICAL RECORD: ICD-10-PCS | Mod: CPTII,S$GLB,, | Performed by: STUDENT IN AN ORGANIZED HEALTH CARE EDUCATION/TRAINING PROGRAM

## 2023-01-23 PROCEDURE — 99999 PR PBB SHADOW E&M-EST. PATIENT-LVL III: CPT | Mod: PBBFAC,,, | Performed by: STUDENT IN AN ORGANIZED HEALTH CARE EDUCATION/TRAINING PROGRAM

## 2023-01-23 NOTE — PROGRESS NOTES
Subjective:   Patient: Barbi Munoz [id number], :1980   Visit date:2023 10:07 AM    Chief Complaint:  No chief complaint on file.      Oncology History   Papillary thyroid carcinoma   2022 Initial Diagnosis    Papillary thyroid carcinoma     10/3/2022 Surgery    Pathology Reviewed     Left neck dissection  232 lymph nodes positive  Largest deposit 14.5mm     Central neck   lymph nodes positive 4.5mm     Thyroid  PTC, Classic 18mm unifocal     pT2N1b  cM0     10/8/2022 Cancer Staged    Staging form: Thyroid - Differentiated, AJCC 8th Edition  - Pathologic stage from 10/8/2022: Stage I (pT1b, pN1b, cM0, Age at diagnosis: < 55 years)               HPI:  Barbi is a 42 y.o. female who is here for follow-up after surgery:    Subjective: She was discharged on 2g TUMS Q6h, calcitriol and magnesium.  She has seen Dr. Medeiros since surgery.      Return clinic visit, 23  Major concern today is lymphedema. Swelling and fullness under the jaw, above the incision line. Range of motion is doing well.       Surgery date: 10/3/22    Operations performed: TT, CND, L MRND    Pathology:  see above    Noteworthy Findings in surgery:  hard, calcified lymph node in left level 5    Review of Systems:  -     Allergic/Immunologic: is allergic to gluten, nickel, latex, and sulfa (sulfonamide antibiotics)..  -     Constitutional: Current temp:      Her meds, allergies, medical, surgical, social & family histories were reviewed & updated:  -     She has a current medication list which includes the following prescription(s): benzoyl peroxide, calcium carbonate, clindamycin, hydrocodone-acetaminophen, ondansetron, and thyroid (pork).  -     She  has a past medical history of Cancer, Celiac disease, Hashimoto's disease, and Interstitial cystitis.   -     She does not have any pertinent problems on file.   -     She  has a past surgical history that includes  section; Cystoscopy; Tonsillectomy;  Adenoidectomy; Dissection of neck (Left, 10/3/2022); and Thyroidectomy (Bilateral, 10/3/2022).  -     She  reports that she quit smoking about 20 years ago. Her smoking use included cigarettes. She has never been exposed to tobacco smoke. She has never used smokeless tobacco. She reports that she does not currently use alcohol. She reports that she does not use drugs.  -     Her family history includes Arthritis in her sister; Autism in her brother; Epilepsy in her brother; Hashimoto's thyroiditis in her sister; Hypertension in her father and mother; No Known Problems in her maternal grandfather, maternal grandmother, paternal grandfather, and paternal grandmother; Ovarian cancer in her paternal aunt.  -     She is allergic to gluten, nickel, latex, and sulfa (sulfonamide antibiotics).    Objective:     Physical Exam:  Vitals:  There were no vitals taken for this visit.  General appearance:  Well developed, well nourished, no apparent distress    Surgical site: incision c/d/I.  Voice strong.  Full shoulder active ROM.     Assessment & Plan:   There are no diagnoses linked to this encounter.   Cancer Staging   Papillary thyroid carcinoma  Staging form: Thyroid - Differentiated, AJCC 8th Edition  - Pathologic stage from 10/8/2022: Stage I (pT1b, pN1b, cM0, Age at diagnosis: < 55 years) - Signed by Francisco Navarro MD on 10/8/2022      Endocrinology:  Thaddeus Medeiros MD (recent labs drawn.  Results pending)  Genetic testing has been sent from primary tumor, pending results  Risk of recurrence approximately 20%; Recommend PENA  In patients with lateral neck lymph nodes, central compartment lymph node ratio remains a good predictor of recurrence.  Her ratio of 0.28 significantly improves her likelihood of DFS.   Cancers 2022, 14 367. [email or web address]  Fully healed surgically.  Will follow up with us as needed.                Update 1/23/23    Continue PT (just started with [facility] in  [place])  Discussed natural course, could continue to slowly improve  Return to clinic as needed  PTC f/u per endo - repeat labs in march

## 2023-02-13 ENCOUNTER — TELEPHONE (OUTPATIENT)
Dept: OTOLARYNGOLOGY | Facility: CLINIC | Age: 43
End: 2023-02-13
Payer: COMMERCIAL

## 2023-02-13 NOTE — TELEPHONE ENCOUNTER
----- Message from Sveta Joiner sent at 2/13/2023 11:36 AM CST -----  Contact: Abr[facility] ([facility])  Abran would like a call back at @ [phone], in regards to following up con the request that was faxed over for the pt a compression pump.

## 2023-02-13 NOTE — TELEPHONE ENCOUNTER
States faxed order for compression pump to [phone].  Advised him that was our phone number.  Provided correct fax.  Once received will have Dr Larios review and sign.

## 2023-02-24 ENCOUNTER — PATIENT MESSAGE (OUTPATIENT)
Dept: OTOLARYNGOLOGY | Facility: CLINIC | Age: 43
End: 2023-02-24
Payer: COMMERCIAL

## 2023-02-27 ENCOUNTER — CLINICAL SUPPORT (OUTPATIENT)
Dept: RESPIRATORY THERAPY | Facility: HOSPITAL | Age: 43
End: 2023-02-27
Attending: INTERNAL MEDICINE
Payer: COMMERCIAL

## 2023-02-27 ENCOUNTER — HOSPITAL ENCOUNTER (OUTPATIENT)
Dept: RADIOLOGY | Facility: HOSPITAL | Age: 43
Discharge: HOME | End: 2023-02-27
Attending: INTERNAL MEDICINE
Payer: COMMERCIAL

## 2023-02-27 DIAGNOSIS — R07.9 CHEST PAIN, UNSPECIFIED: Primary | ICD-10-CM

## 2023-02-27 DIAGNOSIS — R07.9 CHEST PAIN, UNSPECIFIED: ICD-10-CM

## 2023-02-27 PROCEDURE — 93010 EKG 12-LEAD: ICD-10-PCS | Mod: ,,, | Performed by: STUDENT IN AN ORGANIZED HEALTH CARE EDUCATION/TRAINING PROGRAM

## 2023-02-27 PROCEDURE — 71046 X-RAY EXAM CHEST 2 VIEWS: CPT | Mod: TC

## 2023-02-27 PROCEDURE — 93010 ELECTROCARDIOGRAM REPORT: CPT | Mod: ,,, | Performed by: STUDENT IN AN ORGANIZED HEALTH CARE EDUCATION/TRAINING PROGRAM

## 2023-02-27 PROCEDURE — 93005 ELECTROCARDIOGRAM TRACING: CPT

## 2023-03-06 ENCOUNTER — PATIENT MESSAGE (OUTPATIENT)
Dept: OTOLARYNGOLOGY | Facility: CLINIC | Age: 43
End: 2023-03-06
Payer: COMMERCIAL

## 2023-03-08 ENCOUNTER — TELEPHONE (OUTPATIENT)
Dept: OTOLARYNGOLOGY | Facility: CLINIC | Age: 43
End: 2023-03-08
Payer: COMMERCIAL

## 2023-03-08 NOTE — TELEPHONE ENCOUNTER
----- Message from Srinivas Mora sent at 3/8/2023  8:10 AM CST -----  Contact: howard:[phone]  Type:  Patient Returning Call    Who Called:Dr Hernández office   Who Left Message for Patient:maximino   Does the patient know what this is regarding?:sooner appt /pt  Would the patient rather a call back or a response via [place]? Call back  Best Call Back Number:[phone]  Additional Information:

## 2023-03-14 ENCOUNTER — PATIENT MESSAGE (OUTPATIENT)
Dept: OTOLARYNGOLOGY | Facility: CLINIC | Age: 43
End: 2023-03-14
Payer: COMMERCIAL

## 2023-03-14 DIAGNOSIS — C73 PAPILLARY THYROID CARCINOMA: Primary | ICD-10-CM

## (undated) DEVICE — SUT STRATAFIX SPIRAL MONO

## (undated) DEVICE — ELECTRODE REM PLYHSV RETURN 9

## (undated) DEVICE — SUT VICRYL PLUS 3-0 SH 18IN

## (undated) DEVICE — ELECTRODE BLADE W/SLEEVE 2.75

## (undated) DEVICE — GLOVE SURG BIOGEL LATEX SZ 7.5

## (undated) DEVICE — DRAPE THYROID SOFT STERILE

## (undated) DEVICE — SUT VICRYL 3-0 27 SH

## (undated) DEVICE — SYR B-D DISP CONTROL 10CC100/C

## (undated) DEVICE — SYS CLSR DERMABOND PRINEO 22CM

## (undated) DEVICE — TRAY CATH FOL SIL URIMTR 16FR

## (undated) DEVICE — EVACUATOR WOUND BULB 100CC

## (undated) DEVICE — RETRACTOR RADIALUX LIGHTED

## (undated) DEVICE — SUT STRATAFIX 2-0 30CM

## (undated) DEVICE — SUT 2/0 30IN SILK BLK BRAI

## (undated) DEVICE — SHEARS HARMONIC CRVD 9 CM

## (undated) DEVICE — CORD BIPOLAR ELECTROSURGICAL

## (undated) DEVICE — SUT MONOCRYL 4-0 PS-1 UND

## (undated) DEVICE — HEMOSTAT SURGICEL SNOW ABSORB

## (undated) DEVICE — APPLICATOR CHLORAPREP ORN 26ML

## (undated) DEVICE — SPONGE GAUZE 16PLY 4X4

## (undated) DEVICE — DRESSING TRANS 2X2 TEGADERM

## (undated) DEVICE — PACK BASIC SETUP SC BR

## (undated) DEVICE — COVER LIGHT HANDLE 80/CA

## (undated) DEVICE — GAUZE SPONGE PEANUT STRL

## (undated) DEVICE — HOOK STAY ELAS 5MM 8EA/PK

## (undated) DEVICE — GOWN POLY REINF BRTH SLV XL

## (undated) DEVICE — CONTAINER SPECIMEN OR STER 4OZ

## (undated) DEVICE — DRAIN WND 15FRX3/16X4.7MM TRCR

## (undated) DEVICE — SUT PROLENE 3-0 SH DA 36 BL

## (undated) DEVICE — TOWEL OR DISP STRL BLUE 4/PK

## (undated) DEVICE — COVER CAMERA OPERATING ROOM

## (undated) DEVICE — SEE MEDLINE ITEM 157194

## (undated) DEVICE — APPLIER LIGACLIP SM 9.38IN

## (undated) DEVICE — NDL HYPO 27G X 1 1/2

## (undated) DEVICE — SOL NS 1000CC

## (undated) DEVICE — PROBE SIMULATOR KRAFF